# Patient Record
Sex: MALE | Race: WHITE | NOT HISPANIC OR LATINO | Employment: OTHER | URBAN - METROPOLITAN AREA
[De-identification: names, ages, dates, MRNs, and addresses within clinical notes are randomized per-mention and may not be internally consistent; named-entity substitution may affect disease eponyms.]

---

## 2019-04-05 ENCOUNTER — TELEPHONE (OUTPATIENT)
Dept: NEUROLOGY | Facility: CLINIC | Age: 74
End: 2019-04-05

## 2019-04-05 NOTE — TELEPHONE ENCOUNTER
----- Message from Melvina Buchanan sent at 4/5/2019  3:10 PM CDT -----  Contact: Pt   Name of Who is Calling: NIKKI BANERJEE [[id number]]    What is the request in detail: Pt called to request a new pt appt. Pt states he would like a second opinion on his double vision concerns. Please call to further discuss and advise.     Can the clinic reply by [facility]: No     What Number to Call Back if not in [facility]: [phone]

## 2019-04-08 NOTE — TELEPHONE ENCOUNTER
Left pt a vm to call back to make an appt to see Dr. Ruiz. Next availability will be 5/14/19 at 10:00.

## 2019-05-14 ENCOUNTER — OFFICE VISIT (OUTPATIENT)
Dept: NEUROLOGY | Facility: CLINIC | Age: 74
End: 2019-05-14
Attending: PSYCHIATRY & NEUROLOGY
Payer: MEDICARE

## 2019-05-14 ENCOUNTER — LAB VISIT (OUTPATIENT)
Dept: LAB | Facility: OTHER | Age: 74
End: 2019-05-14
Payer: MEDICARE

## 2019-05-14 VITALS
DIASTOLIC BLOOD PRESSURE: 65 MMHG | WEIGHT: 216.69 LBS | BODY MASS INDEX: 29.35 KG/M2 | SYSTOLIC BLOOD PRESSURE: 139 MMHG | HEART RATE: 63 BPM | HEIGHT: 72 IN

## 2019-05-14 DIAGNOSIS — H49.02: ICD-10-CM

## 2019-05-14 DIAGNOSIS — E03.9 ACQUIRED HYPOTHYROIDISM: ICD-10-CM

## 2019-05-14 DIAGNOSIS — I67.89 CEREBRAL MICROVASCULOPATHY: ICD-10-CM

## 2019-05-14 LAB
25(OH)D3+25(OH)D2 SERPL-MCNC: 64 NG/ML (ref 30–96)
ALBUMIN SERPL BCP-MCNC: 3.9 G/DL (ref 3.5–5.2)
ALP SERPL-CCNC: 50 U/L (ref 55–135)
ALT SERPL W/O P-5'-P-CCNC: 19 U/L (ref 10–44)
ANION GAP SERPL CALC-SCNC: 8 MMOL/L (ref 8–16)
AST SERPL-CCNC: 19 U/L (ref 10–40)
BASOPHILS # BLD AUTO: 0.02 K/UL (ref 0–0.2)
BASOPHILS NFR BLD: 0.4 % (ref 0–1.9)
BILIRUB SERPL-MCNC: 0.6 MG/DL (ref 0.1–1)
BUN SERPL-MCNC: 16 MG/DL (ref 8–23)
CALCIUM SERPL-MCNC: 9.3 MG/DL (ref 8.7–10.5)
CHLORIDE SERPL-SCNC: 104 MMOL/L (ref 95–110)
CO2 SERPL-SCNC: 28 MMOL/L (ref 23–29)
CREAT SERPL-MCNC: 1.2 MG/DL (ref 0.5–1.4)
DIFFERENTIAL METHOD: ABNORMAL
EOSINOPHIL # BLD AUTO: 0.2 K/UL (ref 0–0.5)
EOSINOPHIL NFR BLD: 3 % (ref 0–8)
ERYTHROCYTE [DISTWIDTH] IN BLOOD BY AUTOMATED COUNT: 12.6 % (ref 11.5–14.5)
EST. GFR  (AFRICAN AMERICAN): >60 ML/MIN/1.73 M^2
EST. GFR  (NON AFRICAN AMERICAN): >60 ML/MIN/1.73 M^2
ESTIMATED AVG GLUCOSE: 108 MG/DL (ref 68–131)
FOLATE SERPL-MCNC: 10.3 NG/ML (ref 4–24)
GLUCOSE SERPL-MCNC: 88 MG/DL (ref 70–110)
HBA1C MFR BLD HPLC: 5.4 % (ref 4–5.6)
HCT VFR BLD AUTO: 42 % (ref 40–54)
HGB BLD-MCNC: 14.4 G/DL (ref 14–18)
LYMPHOCYTES # BLD AUTO: 1.8 K/UL (ref 1–4.8)
LYMPHOCYTES NFR BLD: 35.6 % (ref 18–48)
MCH RBC QN AUTO: 31.1 PG (ref 27–31)
MCHC RBC AUTO-ENTMCNC: 34.3 G/DL (ref 32–36)
MCV RBC AUTO: 91 FL (ref 82–98)
MONOCYTES # BLD AUTO: 0.4 K/UL (ref 0.3–1)
MONOCYTES NFR BLD: 7.7 % (ref 4–15)
NEUTROPHILS # BLD AUTO: 2.7 K/UL (ref 1.8–7.7)
NEUTROPHILS NFR BLD: 53.1 % (ref 38–73)
PLATELET # BLD AUTO: 119 K/UL (ref 150–350)
PMV BLD AUTO: 11.6 FL (ref 9.2–12.9)
POTASSIUM SERPL-SCNC: 4.4 MMOL/L (ref 3.5–5.1)
PROT SERPL-MCNC: 6.7 G/DL (ref 6–8.4)
RBC # BLD AUTO: 4.63 M/UL (ref 4.6–6.2)
SODIUM SERPL-SCNC: 140 MMOL/L (ref 136–145)
VIT B12 SERPL-MCNC: 231 PG/ML (ref 210–950)
WBC # BLD AUTO: 5.05 K/UL (ref 3.9–12.7)

## 2019-05-14 PROCEDURE — 99214 OFFICE O/P EST MOD 30 MIN: CPT | Mod: PBBFAC | Performed by: PSYCHIATRY & NEUROLOGY

## 2019-05-14 PROCEDURE — 85025 COMPLETE CBC W/AUTO DIFF WBC: CPT

## 2019-05-14 PROCEDURE — 82607 VITAMIN B-12: CPT

## 2019-05-14 PROCEDURE — 36415 COLL VENOUS BLD VENIPUNCTURE: CPT

## 2019-05-14 PROCEDURE — 99205 OFFICE O/P NEW HI 60 MIN: CPT | Mod: S$PBB,,, | Performed by: PSYCHIATRY & NEUROLOGY

## 2019-05-14 PROCEDURE — 80053 COMPREHEN METABOLIC PANEL: CPT

## 2019-05-14 PROCEDURE — 99205 PR OFFICE/OUTPT VISIT, NEW, LEVL V, 60-74 MIN: ICD-10-PCS | Mod: S$PBB,,, | Performed by: PSYCHIATRY & NEUROLOGY

## 2019-05-14 PROCEDURE — 83036 HEMOGLOBIN GLYCOSYLATED A1C: CPT

## 2019-05-14 PROCEDURE — 99999 PR PBB SHADOW E&M-EST. PATIENT-LVL IV: CPT | Mod: PBBFAC,,, | Performed by: PSYCHIATRY & NEUROLOGY

## 2019-05-14 PROCEDURE — 82746 ASSAY OF FOLIC ACID SERUM: CPT

## 2019-05-14 PROCEDURE — 99999 PR PBB SHADOW E&M-EST. PATIENT-LVL IV: ICD-10-PCS | Mod: PBBFAC,,, | Performed by: PSYCHIATRY & NEUROLOGY

## 2019-05-14 PROCEDURE — 82306 VITAMIN D 25 HYDROXY: CPT

## 2019-05-14 NOTE — PROGRESS NOTES
Subjective:       Patient ID: Earl Baldwin is a 74 y.o. male.    Chief Complaint:  Eye Problem (pt had MRI) and Neurologic Problem  acute onset of double vision    History of Present Illness  73 yo RHM w/ cataracts b/l s/p surgical intervention, hypothyroidism, o/w healthy who presents for double vision.  Pt noted acute onset of double vision in December 2018.  This was associated with an acute sensation of imbalance as well.  This happened in the middle of the night.  When he awoke he also noted the double vision.  If he closed his eyes and it resolved.  He did have persistent imbalance and nausea.  He denies any visual field or acuity changes .    He was evaluated by ENT who ruled out an inner ear issue.  He also saw an ophthalmologist who diagnosed with a 3rd nerve palsy.  No noticeable assymetry in pupils but there was divergent eye movements.     Over 2-3 months the double vision resolved.   He did have residual lower R peripheral vision issues.  He is unsure if that was monocular or binocular.     He denied any associated focal weakness, tingling, slurred speech, changes in thought,  He does endorse chronic tinnitus from his time in the .           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       Past Surgical History:   Procedure Laterality Date    ADENOIDECTOMY      EYE SURGERY      hydrocele repair      KNEE ARTHROSCOPY W/ DEBRIDEMENT      left    MRI (Magnetic Resonance Imagine) needs anesthesia N/A 12/17/2018    Performed by [facility]    SPINE SURGERY      TONSILLECTOMY         Family History   Problem Relation Age of Onset    Diabetes Father     Emphysema Father     Diabetes Sister        Social History     Socioeconomic History    Marital status:      Spouse name: Not on file    Number of children: Not on file    Years of education:  Not on file    Highest education level: Not on file   Occupational History    Not on file   Social Needs    Financial resource strain: Not on file    Food insecurity:     Worry: Not on file     Inability: Not on file    Transportation needs:     Medical: Not on file     Non-medical: Not on file   Tobacco Use    Smoking status: Former Smoker     Last attempt to quit: 1979     Years since quittin.3    Smokeless tobacco: Never Used   Substance and Sexual Activity    Alcohol use: Yes     Comment: seldom    Drug use: No    Sexual activity: Yes     Partners: Female   Lifestyle    Physical activity:     Days per week: Not on file     Minutes per session: Not on file    Stress: Not on file   Relationships    Social connections:     Talks on phone: Not on file     Gets together: Not on file     Attends Christian service: Not on file     Active member of club or organization: Not on file     Attends meetings of clubs or organizations: Not on file     Relationship status: Not on file   Other Topics Concern    Not on file   Social History Narrative    Not on file         Current Outpatient Medications:     fish oil-omega-3 fatty acids 300-1,000 mg capsule, Take 1,000 mg by mouth once daily.  , Disp: , Rfl:     glucosam-chondro-herb 149-hyal (GLUCOS CHOND CPLX ADVANCED ORAL), Take 1 tablet by mouth once daily., Disp: , Rfl:     lysine 500 mg Tab, Take 500 mg by mouth once daily.  , Disp: , Rfl:     magnesium gluconate (MAGONATE) 27.5 mg (500 mg) tablet, Take 500 mg by mouth once daily.  , Disp: , Rfl:     thyroid,pork (NATURE-THROID ORAL), Take 194 mg by mouth once daily. , Disp: , Rfl:     vitamin E 1000 UNIT capsule, Take 1,000 Units by mouth once daily., Disp: , Rfl:     Review of Systems  Review of Systems   Constitutional: Negative for chills and fever.   HENT: Positive for tinnitus. Negative for hearing loss.    Eyes: Positive for double vision.   Respiratory: Negative for cough and  hemoptysis.    Cardiovascular: Negative for chest pain and palpitations.   Gastrointestinal: Positive for nausea. Negative for vomiting.   Musculoskeletal: Negative for back pain, falls, joint pain, myalgias and neck pain.   Neurological: Positive for dizziness. Negative for tingling, tremors, sensory change, speech change, focal weakness, seizures, loss of consciousness, weakness and headaches.   Psychiatric/Behavioral: Negative for depression, hallucinations, memory loss, substance abuse and suicidal ideas. The patient is not nervous/anxious and does not have insomnia.        Objective:     Vitals:    05/14/19 1019   BP: 139/65   Pulse: 63      Physical Exam      Constitutional: male appears well-developed and well-nourished.   HENT:   Head: Normocephalic and atraumatic.   Neck and spine: Normal range of motion. Neck supple. No TTP in cervical, thoracic, and lumbar spine  Cardiovascular: Normal rate, regular rhythm and normal heart sounds.  No carotid bruits  Pulmonary/Chest: Effort normal and breath sounds normal.   Abdominal: Soft. Bowel sounds are normal.   Skin: Skin is warm.   Ext: No c/c/e noted    The patient is awake, attentive, Alert, oriented to person, place and time.  Language is intact to comprehension, repetition, and production  Able to follow multi-step commands  No findings to suggest executive dysfuntion    Patient has adequate insight    Mood is stable    No CI  Pursuits were smooth, normal saccades, no nystagmus bilaterally  PERRL, VFFC, EOMI, sensation is intact to LT b/l,    Motor - facial movement was symmetrical and normal, no facial droop seen.   hearing grossly intact  Palate moved well and was symmetrical with normal palatal and oral sensation  Tongue protrudes midline and movements were full  Traps elevated equally b/l      Normal tone.  No spasticity, cogwheel rigidity  Normal bulk. No pronator drift                        Right      Left  Deltoid            5          5  Biceps             5          5  Triceps           5          5  BR                  5          5                   5          5    Hip Flex            5          5  Hip Ext             5          5  Knee Flex         5          5  Knee Ext          5          5  Plantar Flexion  5          5  Dorsiflexion       5          5      No tremor noted    Reflexes trace and symmteric in bl upper and lower extremities, including biceps, triceps, and patellar    Sensory:  Light touch:  intact  Vibration:  intact  Temperature:  intact    Coordination:  F-to-N:  intact    Rapid-alt movements:  Normal amplitude and frequency     Romberg Sign:  negative  General gait:   Decreased arm swing andnormal  turns. Normal postural reflexes.   Tandem gait:  intact        Results for NIKKI BANERJEE (MRN [id number]) as of 5/14/2019 10:33   Ref. Range 1/14/2010 07:50   Sodium Latest Ref Range: 136 - 145 mMol/l 138   Potassium Latest Ref Range: 3.5 - 5.1 mMol/l 3.6   Chloride Latest Ref Range: 95 - 110 mMol/l 103   CO2 Latest Ref Range: 23.0 - 29.0 mEq/L 26   Anion Gap Latest Ref Range: 10 - 20 mmol/L 14   BUN, Bld Latest Ref Range: 8 - 23 mg/dl 14   Creatinine Latest Ref Range: 0.5 - 1.4 mg/dl 1.1   eGFR if non African American Latest Ref Range: >60 mL/min >60   eGFR if  Latest Ref Range: >60 mL/min >60   Glucose Latest Ref Range: 70 - 110 mg/dl 91   Calcium Latest Ref Range: 8.7 - 10.5 mg/dl 9.1   Triglycerides Latest Ref Range: 30 - 150 mg/dL 125   Cholesterol Latest Ref Range: 120 - 199 mg/dL 210 (H)   HDL Latest Ref Range: 40 - 75 mg/dL 42   Hdl/Cholesterol Ratio Latest Ref Range: 20 - 50 % 20.0   LDL Cholesterol Latest Ref Range: 63 - 129 mg/dl 143.0 (H)   Total Cholesterol/HDL Ratio Latest Ref Range: 2.0 - 5.0  5.0   TSH Latest Ref Range: 0.4 - 4.0 uIU/ml 0.065 (L)   T3, Free Latest Ref Range: 2.3 - 4.2 pg/mL 2.9   T4 Total Latest Ref Range: 4.5 - 11.5 ug/dl 5.5   Free T4 Latest Ref Range: 0.71 - 1.51 ng/dl 0.95   PSA, SCREEN  Latest Ref Range: 0 - 4.0 ng/ml 1.0               TSH   Date/Time Value Ref Range Status   01/14/2010 07:50 AM 0.065 (L) 0.4 - 4.0 uIU/ml Final   Neuro-imaging personally reviewed    Results for orders placed or performed during the hospital encounter of 12/17/18   MRI Brain W WO Contrast    Narrative    EXAMINATION:  MRI BRAIN W WO CONTRAST    CLINICAL HISTORY:  other; Third (oculomotor) nerve palsy, left eye    TECHNIQUE:  Multiplanar multisequence MR imaging of the brain was performed before and after the administration of 10 mL Gadavist intravenous contrast.    COMPARISON:  None    FINDINGS:  Symmetric lateral ventricles without hydrocephalus.  There is a small persistent cavum septum pellucidum.    In the subcortical white matter and in the deep white matter of the frontal and the parietal lobes there are a few scattered T2 hyperintensities without restricted diffusion or abnormal enhancement, likely representing chronic microvascular ischemic changes.  No definite for acute infarctions or neoplasms or midline shift or restricted diffusion or abnormal enhancement.  Basal ganglia are symmetric and normal bilaterally.    In the posterior fossa the 4th ventricle is in the midline.  Prominent CSF space dorsal to the vermis, likely representing moderately prominent cisterna magna.  Along the inferior surface of the left cerebellum (image 4 series 6, 7, 16 and 11) there is a faintly T1 hyperintense and a T2 hyperintense lesion with questionable linear enhancement on the postcontrast study.  No vasogenic edema associated with this lesion.  It is possible this could represent a small vascular lesion, like a capillary angioma.  The lesion measures approximately 2 mm in its diameter peer a short-term follow-up MRI in approximately 3-4 months time is suggested.  Brainstem is within normal limits.    There is a dominant tortuous right vertebral artery with normal flow void.  The flow void of the cavernous carotid arteries  and the basilar vessels is otherwise within normal limits.    No definite signs for parasellar masses.    The visualized paranasal air sinuses demonstrate no significant abnormalities except for mild mucosal thickening in the ethmoid sinus from mild sinusitis.    There is a fluid level in the nasopharynx, likely representing secretions in the pharynx.      Impression    1. Subcortical and deep white matter changes, likely representing microvascular ischemic changes.  2. A approximately 2 mm faintly T1 hyperintense and T2 hyperintense focus in the inferior left cerebellum with questionable enhancement.  Etiology for this tiny lesion is not clear from this study but could represent a small lesion like a capillary angioma.  To rule out a neoplastic process a short-term follow-up MRI in approximately 3 months time is suggested.      Electronically signed by: Noe Petty MD  Date:    12/17/2018  Time:    12:12     Assessment:     Problem List Items Addressed This Visit        Neuro    Cerebral microvasculopathy    Relevant Orders    Vitamin B12    Folate    Vitamin D    Hemoglobin A1c    CTA Head    CTA Neck    CBC auto differential    Comprehensive metabolic panel    Echo congenital anomaly 2d only       Ophtho    Total left oculomotor nerve palsy    Current Assessment & Plan     See below         Relevant Orders    Vitamin B12    Folate    Vitamin D    Hemoglobin A1c    CTA Head    CTA Neck    CBC auto differential    Comprehensive metabolic panel    Echo congenital anomaly 2d only       Endocrine    Acquired hypothyroidism    Overview     Results for NIKKI BANERJEE (MRN [id number]) as of 5/14/2019 11:22   Ref. Range 1/14/2010 07:50   TSH Latest Ref Range: 0.4 - 4.0 uIU/ml 0.065 (L)   T3, Free Latest Ref Range: 2.3 - 4.2 pg/mL 2.9   T4 Total Latest Ref Range: 4.5 - 11.5 ug/dl 5.5   Free T4 Latest Ref Range: 0.71 - 1.51 ng/dl 0.95            Current Assessment & Plan     See below                  1. Total left  oculomotor nerve palsy  Vitamin B12    Folate    Vitamin D    Hemoglobin A1c    CTA Head    CTA Neck    CBC auto differential    Comprehensive metabolic panel    Echo congenital anomaly 2d only   2. Cerebral microvasculopathy  Vitamin B12    Folate    Vitamin D    Hemoglobin A1c    CTA Head    CTA Neck    CBC auto differential    Comprehensive metabolic panel    Echo congenital anomaly 2d only   3. Acquired hypothyroidism         73 yo RHM w/ cataracts b/l s/p surgical intervention, hypothyroidism, o/w healthy who presents for double vision.  History is notable for acute onset of double vision and vertigo with subacute but complete resolution.  Exam is notable for WDWN male with no carotid bruits.  Neuro exam is notable for PERRL,   Workup is notable abnormal FLP, WNL CBC/CMP  Pt's presentation is concerning for 3rd nerve palsy now resolved.   Potential but unlikely etiologies including toxic/metabolic (vitamin deficiency), vascular (ophthalmic artery stroke, PCA aneurysm, cerebellar cavernoma), autoimmune/inflammatory (MS)      Plan:      3rd nerve palsy  -labs: B12/folate, HgA1c,  -start Asa 81mg daily  -audiology ([place]): [place]  -CTA H/N, (if deferred will obtain Carotid US)  -ECHO  -f/u Ophthalmology   -brain health    I went over the usual stroke warning signs and symptoms, and the need to activate EMS (call 911) as soon as the symptoms present.  - Sudden onset numbness or weakness of the face, arm, or leg; especially on one side of the body  - Sudden confusion, trouble speaking, or understanding  - Sudden trouble seeing in one or both eyes  - Sudden trouble walking, dizziness, loss of coordination  - Sudden severe headache with no known cause    Hypothyroidism: cont thyroid supplement, monitor TFTs, PCP        Ana Ruiz MD  Neurologist  [facility]     Focused examination was undertaken today.  I spent 60 minutes with the patient  total face to face with the patient.  >50%  of that time was spent on counseling regarding his symptoms, review of diagnostics, and building and coordinating a treatment plan based on the combination of results and symptoms.   Questions were sought and answered to her stated verbal satisfaction.

## 2019-05-14 NOTE — PATIENT INSTRUCTIONS
Thank you for visiting us at [facility].  You were seen by Dr. Ana Ruiz.  Please share any feedback about your experience today on [email or web address] and/or [email or web address].  We appreciate the privilege to participate in your healthcare.  You were seen for double vision.  We will be recommending the following:     3rd nerve palsy  -labs: B12/folate, Vitamin D, HgA1c,  -start Asa 81mg daily, monitor for bleeding/bruising  -audiology ([place]): [place]  -CTA Head/neck (if you choose not to do this please let me know and we can get another less invasive test)  -ECHO (heart ultrasound)  -see Ophthalmology   -brain health    I went over the usual stroke warning signs and symptoms, and the need to activate EMS (call 911) as soon as the symptoms present.  - Sudden onset numbness or weakness of the face, arm, or leg; especially on one side of the body  - Sudden confusion, trouble speaking, or understanding  - Sudden trouble seeing in one or both eyes  - Sudden trouble walking, dizziness, loss of coordination  - Sudden severe headache with no known cause    -Brain Health lifestyle modifications:    -sleep hygiene   - diet: Mediterranean Diet    -exercise: 20-30 minutes of  Moderate exercise 3-5 times a week   -stress management reviewed   -smoking cessation, alcohol moderation    Check out my blog post for further information:  [email or web address]      Sleep Hygiene:    1. Avoid watching TV, eating, and discussing emotional issues in bed. The bed should be used for sleep and sex only. If not, we can associate the bed with other activities and it often becomes difficult to fall asleep.  2. Minimize noise, light, and temperature extremes during sleep with ear plugs, window blinds, or an electric blanket or air conditioner. Even the slightest nighttime noises or luminescent lights can disrupt the quality of your sleep. Try to keep your bedroom at a  comfortable temperature -- not too hot (above 75 degrees) or too cold (below 54 degrees).  3. Try not to drink fluids after 8 p.m. This may reduce awakenings due to urination.  4. Avoid naps, but if you do nap, make it no more than about 25 minutes about eight hours after you awake. But if you have problems falling asleep, then no naps for you.  5. Do not expose your self to bright light if you need to get up at night. Use a small night-light instead.  6. Nicotine is a stimulant and should be avoided particularly near bedtime and upon night awakenings. Having a smoke before bed, although it may feel relaxing, is actually putting a stimulant into your bloodstream.  7. Caffeine is also a stimulant and is present in coffee (100-200 mg), soda (50-75 mg), tea (50-75 mg), and various over-the-counter medications. Caffeine should be discontinued at least four to six hours before bedtime. If you consume large amounts of caffeine and you cut your self off too quickly, beware; you may get headaches that could keep you awake.  8. Although alcohol is a depressant and may help you fall asleep, the subsequent metabolism that clears it from your body when you are sleeping causes a withdrawal syndrome. This withdrawal causes awakenings and is often associated with nightmares and sweats.  9. A light snack may be sleep-inducing, but a heavy meal too close to bedtime interferes with sleep. Stay away from protein and stick to carbohydrates or dairy products. Milk contains the amino acid L-tryptophan, which has been shown in research to help people go to sleep. So milk and cookies or crackers (without chocolate) may be useful and taste good as well.          You may receive a survey about your experience at [facility].  We appreciate you taking the time to complete the survey to help us improve our service and care.

## 2019-05-15 ENCOUNTER — TELEPHONE (OUTPATIENT)
Dept: NEUROLOGY | Facility: CLINIC | Age: 74
End: 2019-05-15

## 2019-05-15 DIAGNOSIS — H93.A9 PULSATILE TINNITUS: ICD-10-CM

## 2019-05-17 ENCOUNTER — HOSPITAL ENCOUNTER (OUTPATIENT)
Dept: RADIOLOGY | Facility: HOSPITAL | Age: 74
Discharge: HOME | End: 2019-05-17
Attending: PSYCHIATRY & NEUROLOGY
Payer: MEDICARE

## 2019-05-17 DIAGNOSIS — H49.02: ICD-10-CM

## 2019-05-17 DIAGNOSIS — I67.89 CEREBRAL MICROVASCULOPATHY: ICD-10-CM

## 2019-05-17 PROCEDURE — 70498 CT ANGIOGRAPHY NECK: CPT | Mod: 26,,, | Performed by: RADIOLOGY

## 2019-05-17 PROCEDURE — 70496 CT ANGIOGRAPHY HEAD: CPT | Mod: 26,,, | Performed by: RADIOLOGY

## 2019-05-17 PROCEDURE — 70498 CTA HEAD AND NECK (XPD): ICD-10-PCS | Mod: 26,,, | Performed by: RADIOLOGY

## 2019-05-17 PROCEDURE — 25500020 PHARM REV CODE 255: Performed by: PSYCHIATRY & NEUROLOGY

## 2019-05-17 PROCEDURE — 70496 CT ANGIOGRAPHY HEAD: CPT | Mod: TC

## 2019-05-17 PROCEDURE — 70496 CTA HEAD AND NECK (XPD): ICD-10-PCS | Mod: 26,,, | Performed by: RADIOLOGY

## 2019-05-17 PROCEDURE — 70498 CT ANGIOGRAPHY NECK: CPT | Mod: TC

## 2019-05-17 RX ADMIN — IOHEXOL 100 ML: 350 INJECTION, SOLUTION INTRAVENOUS at 09:05

## 2019-06-06 ENCOUNTER — TELEPHONE (OUTPATIENT)
Dept: NEUROLOGY | Facility: CLINIC | Age: 74
End: 2019-06-06

## 2019-06-06 DIAGNOSIS — I67.89 CEREBRAL MICROVASCULOPATHY: ICD-10-CM

## 2019-06-06 NOTE — TELEPHONE ENCOUNTER
----- Message from Barb Sheffield MA sent at 6/5/2019  1:31 PM CDT -----  Contact: Self  Please advise, pt have an existing order for an echo test to be done. Its not letting me schedule from order due to some type of restriction. Pt wants to know is this test still needed or did you receive enough information from the CTA/CT?  ----- Message -----  From: Andie Chan  Sent: 6/5/2019   1:02 PM  To: [facility] Staff    Type:  Sooner Apoointment Request    Caller is requesting a sooner appointment.  Caller declined first available appointment listed below.  Caller will not accept being placed on the waitlist and is requesting a message be sent to doctor.    Name of Caller:  patient  Best Call Back Number:  [phone]  Additional Information:  Needs to make appt

## 2019-06-06 NOTE — TELEPHONE ENCOUNTER
CTA H/N negative for aneurysm.  Would recommend ECHO.  Re-ordered. Will have staff schedule and confirm 3 month clinic f/u.

## 2019-06-19 ENCOUNTER — HOSPITAL ENCOUNTER (OUTPATIENT)
Dept: CARDIOLOGY | Facility: HOSPITAL | Age: 74
Discharge: HOME | End: 2019-06-19
Attending: PSYCHIATRY & NEUROLOGY
Payer: MEDICARE

## 2019-06-19 DIAGNOSIS — I67.89 CEREBRAL MICROVASCULOPATHY: ICD-10-CM

## 2019-06-19 PROCEDURE — 93306 TTE W/DOPPLER COMPLETE: CPT

## 2019-06-20 LAB
AORTIC ROOT ANNULUS: 4.16 CM
AORTIC VALVE CUSP SEPERATION: 2.26 CM
AV INDEX (PROSTH): 0.35
AV MEAN GRADIENT: 13.13 MMHG
AV PEAK GRADIENT: 20.07 MMHG
AV VALVE AREA: 1.49 CM2
AV VELOCITY RATIO: 0.36
CV ECHO LV RWT: 0.41 CM
DOP CALC AO PEAK VEL: 2.24 M/S
DOP CALC AO VTI: 51.02 CM
DOP CALC LVOT AREA: 4.23 CM2
DOP CALC LVOT DIAMETER: 2.32 CM
DOP CALC LVOT PEAK VEL: 0.8 M/S
DOP CALC LVOT STROKE VOLUME: 75.97 CM3
DOP CALCLVOT PEAK VEL VTI: 17.98 CM
E WAVE DECELERATION TIME: 270.28 MSEC
E/A RATIO: 0.92
E/E' RATIO: 8.14
ECHO LV POSTERIOR WALL: 1.21 CM (ref 0.6–1.1)
FRACTIONAL SHORTENING: 30 % (ref 28–44)
INTERVENTRICULAR SEPTUM: 1.21 CM (ref 0.6–1.1)
IVRT: 0.06 MSEC
LEFT ATRIUM SIZE: 3.99 CM
LEFT INTERNAL DIMENSION IN SYSTOLE: 4.08 CM (ref 2.1–4)
LEFT VENTRICLE DIASTOLIC VOLUME: 170.32 ML
LEFT VENTRICLE SYSTOLIC VOLUME: 73.29 ML
LEFT VENTRICULAR INTERNAL DIMENSION IN DIASTOLE: 5.86 CM (ref 3.5–6)
LEFT VENTRICULAR MASS: 305.47 G
LV LATERAL E/E' RATIO: 7.13
LV SEPTAL E/E' RATIO: 9.5
MV PEAK A VEL: 0.62 M/S
MV PEAK E VEL: 0.57 M/S
PISA TR MAX VEL: 1.64 M/S
PV PEAK VELOCITY: 1.04 CM/S
RA PRESSURE: 3 MMHG
RIGHT VENTRICULAR END-DIASTOLIC DIMENSION: 3.35 CM
TDI LATERAL: 0.08
TDI SEPTAL: 0.06
TDI: 0.07
TR MAX PG: 10.76 MMHG
TRICUSPID ANNULAR PLANE SYSTOLIC EXCURSION: 1.81 CM
TV REST PULMONARY ARTERY PRESSURE: 14 MMHG

## 2020-02-24 NOTE — PROGRESS NOTES
Earl Baldwin, a 75 y.o. male, presents today for evaluation of his Left and Right knee.      History of Present Illness (HPI)  Location: anterior knee, L>R  Onset: insidious, chronic  Palliative:    Relative rest   Oral analgesics   KRISTINE MCCULLOUGH, 10.2019, per [facility], 100% for 1 hour   fPJUSTYNA, @mary KING date: 10.2019, duration: 4 weeks, minimal Improvement d/c on 12.2019  Provocative:    ADLS   Prolonged ambulation  Prior:  has a past surgical history that includes Spine surgery; Knee arthroscopy w/ mitali LFabio (1990, Dr. Ameya SELLERS)  Progression: worsening discomfort  Quality:    sharp pain  Radiation: none  Severity: per nursing documentation  Timing: intermittent w/ use  Trauma: none     Review of Systems (ROS)  A 10+ review of systems was performed with pertinent positives and negatives noted above in the history of present illness. Other systems were negative unless otherwise specified.    Physical Examination (PE)  General:  The patient is alert and oriented x 3. Mood is pleasant. Observation of ears, eyes and nose reveal no gross abnormalities. HEENT: NCAT, sclera anicteric.   Lungs: Respirations are equal and unlabored.  Gait is coordinated. Patient can toe walk and heel walk without difficulty.    LEFT KNEE EXAMINATION    Observation/Inspection  Gait:   Nonantalgic   Alignment:  Neutral   Scars:   None   Muscle atrophy: Mild  Effusion:  None   Warmth:  None   Discoloration:   none     Tenderness / Crepitus (T / C):         T / C      T / C  Patella   - / -   Lateral joint line   - / -     Peripatellar medial  -  Medial joint line    + / -  Peripatellar lateral -  Medial plica   - / -  Patellar tendon -   Popliteal fossa   - / -  Quad tendon   -   Gastrocnemius   -  Prepatellar Bursa - / -   Quadricep   -  Tibial tubercle  -  Thigh/hamstring  -  Pes anserine/HS -  Fibula    -  ITB   - / -  Tibia     -  Tib/fib joint  - / -  LCL    -    MFC   - / -   MCL: Proximal  -    LFC   - / -    Distal    -          ROM: (* = pain)  PASSIVE   ACTIVE    Left :   5 / 0 / 145   5 / 0 / 145     Right :    5 / 0 / 145   5 / 0 / 145    Patellofemoral examination:  See above noted areas of tenderness.   Patella position    Subluxation / dislocation: Centered        Sup. / Inf;   Normal   Crepitus (PF):    Absent   Patellar Mobility:       Medial-lateral:   Normal    Superior-inferior:  Normal    Inferior tilt   Normal    Patellar tendon:  Normal   Lateral tilt:    Normal   J-sign:     None   Patellofemoral grind:   No pain     Meniscal Signs:     Pain on terminal extension:  +  Pain on terminal flexion:  +  Felicias maneuver:  +*  Squat     NT  Thessaly    NT    Ligament Examination:  ACL / Lachman:  WNL  PCL-Post.  drawer: normal 0 to 2mm  MCL- Valgus:  normal 0 to 2mm  LCL- Varus:    normal 0 to 2mm  Pivot shift:  guarding   Dial Test:   difference c/w other side   At 30° flexion: normal (< 5°)    At 90° flexion: normal (< 5°)   Reverse Pivot Shift:   normal (Equal)     Strength: (* = with pain) Painful Side  Quadriceps   5/5  Hamstrin/5    Extremity Neuro-vascular Examination:   Sensation:  Grossly intact to light touch all dermatomal regions.   Motor Function:  Fully intact motor function at hip, knee, foot and ankle    DTRs;  quadriceps and  achilles 2+.  No clonus and downgoing Babinski.    Vascular status:  DP and PT pulses 2+, brisk capillary refill, symmetric.     Other Findings:    ASSESSMENT & PLAN  Assessment:   #1 Kellgren-Juna Carlos Grade III osteoarthritis of knee, luis enrique med+ant compartments, bilat  Knee pain left >> right    No evidence of neurologic pathology  No evidence of vascular pathology    Imaging studies reviewed:   X-ray knee, bilateral 20.02    Plan:   We discussed the importance of appropriate diet, weight, and regular exercise    We discussed options including    Watchful waiting / relative rest    Physical therapy x   Injection therapy prp+bmc   Consultation    The patient  chooses As above   x = prescribed  CSI = corticosteroid injection  VSI = viscosupplement injection  PRPI = platelet rich plasma injection  ia = intra articular  R = right  L = left  B = bilateral     Physical Therapy        Formal (fPT), @ [facility] facility    Formal (fPT), @ [facility] facility b       Homegoing (hgPT), per concurrent fPT recommendations    Homegoing (hgPT), per prior fPT recommendations    Homegoing (hgPT), handout provided        w/  (atPT)    [blank] = not prescribed  x = prescribed  b = prescribed, and begin as indicated  t = continue as indicated  r = prescribed, and restart as indicated  p = completed prior as indicated  hs = prescribed, and with high school   col = prescribed, and with college or university   nf = physical therapy was recommended, but patient is not interested in PT at this time    Activity (e.g. sports, work) restrictions    [blank] = as tolerated  pt = per physical therapist  at = per     Bracing    [blank] = not prescribed  r = recommended, but not fit with at todays visit  f = prescribed and fit with at todays visit  t = continue as indicated    Pain management    [blank] = No prescription necessary. A handout detailing dosing of appropriate   over-the-counter musculoskeletal analgesics was made available to the patient.   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

## 2020-02-26 ENCOUNTER — HOSPITAL ENCOUNTER (OUTPATIENT)
Dept: RADIOLOGY | Facility: HOSPITAL | Age: 75
Discharge: HOME | End: 2020-02-26
Attending: FAMILY MEDICINE
Payer: MEDICARE

## 2020-02-26 ENCOUNTER — OFFICE VISIT (OUTPATIENT)
Dept: SPORTS MEDICINE | Facility: CLINIC | Age: 75
End: 2020-02-26
Payer: MEDICARE

## 2020-02-26 VITALS — HEIGHT: 72 IN | TEMPERATURE: 97 F | BODY MASS INDEX: 29.26 KG/M2 | WEIGHT: 216 LBS

## 2020-02-26 DIAGNOSIS — G89.29 CHRONIC PAIN OF LEFT KNEE: Primary | ICD-10-CM

## 2020-02-26 DIAGNOSIS — M17.0 PRIMARY OSTEOARTHRITIS OF BOTH KNEES: ICD-10-CM

## 2020-02-26 DIAGNOSIS — M25.562 CHRONIC PAIN OF LEFT KNEE: Primary | ICD-10-CM

## 2020-02-26 DIAGNOSIS — M25.562 LEFT KNEE PAIN, UNSPECIFIED CHRONICITY: ICD-10-CM

## 2020-02-26 PROCEDURE — 0232T NJX PLATELET PLASMA: CPT | Mod: CSM,,, | Performed by: FAMILY MEDICINE

## 2020-02-26 PROCEDURE — 73564 X-RAY EXAM KNEE 4 OR MORE: CPT | Mod: TC,50

## 2020-02-26 PROCEDURE — 99204 OFFICE O/P NEW MOD 45 MIN: CPT | Mod: CSM,S$GLB,, | Performed by: FAMILY MEDICINE

## 2020-02-26 PROCEDURE — 99213 OFFICE O/P EST LOW 20 MIN: CPT | Mod: PBBFAC,25 | Performed by: FAMILY MEDICINE

## 2020-02-26 PROCEDURE — 99999 PR PBB SHADOW E&M-EST. PATIENT-LVL III: ICD-10-PCS | Mod: PBBFAC,,, | Performed by: FAMILY MEDICINE

## 2020-02-26 PROCEDURE — 0232T PR INJECT PLATELET PLASMA W/IMG HARVEST/PREPARATOIN: ICD-10-PCS | Mod: CSM,,, | Performed by: FAMILY MEDICINE

## 2020-02-26 PROCEDURE — 99999 PR PBB SHADOW E&M-EST. PATIENT-LVL III: CPT | Mod: PBBFAC,,, | Performed by: FAMILY MEDICINE

## 2020-02-26 PROCEDURE — 73564 XR KNEE ORTHO BILAT WITH FLEXION: ICD-10-PCS | Mod: 26,50,, | Performed by: RADIOLOGY

## 2020-02-26 PROCEDURE — 99204 PR OFFICE/OUTPT VISIT, NEW, LEVL IV, 45-59 MIN: ICD-10-PCS | Mod: CSM,S$GLB,, | Performed by: FAMILY MEDICINE

## 2020-02-26 PROCEDURE — 73564 X-RAY EXAM KNEE 4 OR MORE: CPT | Mod: 26,50,, | Performed by: RADIOLOGY

## 2020-02-26 NOTE — PROGRESS NOTES
Patient indications  Earl Baldwin, a 75 y.o. male, presents today with:  #1 Kellgren-Juan Carlos Grade III osteoarthritis of knee, luis enrique med+ant compartments, LEFT    Procedure performed  Platelet rich plasma (PRP) injection performed using ultrasound guidance for exact placement of orthobiologic at pathologic site     Patient consent to perform procedure  See the electronic medical record for full written and signed patient consent to proceed with this procedure.    Description of procedure    A) Surgical time out  A surgical time out was performed, including verification of patient ID, procedure to be performed, site and side, availability of information and equipment, review of safety issues, and the patients agreement and consent.     B) Phlebotomy, platelet harvest, and PRP preparation  Sterile technique was used to phlebotomize 120mL of the patients blood from a peripheral vein. This blood was  into density-divided layers using an [employer] centrifuge. The layer of leukocyte poor (hematocrit 2%) PRP, a volume of 3.2mL, was placed into a sterile syringe in preparation for injection.     C) Platelet rich plasma delivery to pathologic site  Injection site: LEFT KNEE  Using 1) physical examination and 2) musculoskeletal ultrasound, the anatomy was visualized and the diseased tissue was identified and confirmed. The procedure site was marked with a skin marker. The patient was placed in position maximizing 1) physician access to pathologic tissue and 2) patient comfort. The skin about the area was sterilized with ChloraPrep (2%w/v CHG, 70% IPA). The site of needle insertion was anesthetized using vapocoolant. Under ultrasound guidance, the needle was advanced to the site of tissue pathology, and the injectate was deposited under direct visualization.    D) Post-procedure care  Following the procedure, a sterile bandage was placed over the injection site.  A procedure after care instructions (ACI)  handout was provided to the patient.    Complications: none     Estimated blood loss from injection procedure: none     Joint aspirate volume, if required: 0 mL     Disposition  The patient tolerated the procedure well and there were no immediate complications. The patient was instructed to call the clinic immediately for any mild to moderate adverse side effects, or to call 911 in the event of an emergency.        Description of ultrasound utilization for needle / probe guidance  Ultrasound guidance was used for needle / probe localization. Images (and videos, if appropriate) were saved and stored for documentation. Dynamic visualization of the needle / probe was continuous throughout the procedure.    0232T

## 2020-02-27 ENCOUNTER — TELEPHONE (OUTPATIENT)
Dept: SPORTS MEDICINE | Facility: CLINIC | Age: 75
End: 2020-02-27

## 2020-02-27 DIAGNOSIS — G89.29 CHRONIC PAIN OF LEFT KNEE: ICD-10-CM

## 2020-02-27 DIAGNOSIS — M25.562 CHRONIC PAIN OF LEFT KNEE: ICD-10-CM

## 2020-02-27 DIAGNOSIS — M17.0 PRIMARY OSTEOARTHRITIS OF BOTH KNEES: Primary | ICD-10-CM

## 2020-02-27 NOTE — TELEPHONE ENCOUNTER
----- Message from Indu Mehta sent at 2/27/2020 11:28 AM CST -----  Contact: PT needs PT orders sent to [facility] in [facility]#   PT needs PT orders sent to [facility] in [facility]#     PT states he needs for physical therapy orders to be sent to [facility] in [place] @# [phone]     Please send over PT orders to [facility] for any problems please contact patient back @# [phone]

## 2020-03-04 ENCOUNTER — OFFICE VISIT (OUTPATIENT)
Dept: SPORTS MEDICINE | Facility: CLINIC | Age: 75
End: 2020-03-04

## 2020-03-04 VITALS — WEIGHT: 216 LBS | TEMPERATURE: 98 F | HEIGHT: 72 IN | BODY MASS INDEX: 29.26 KG/M2

## 2020-03-04 DIAGNOSIS — M25.562 CHRONIC PAIN OF LEFT KNEE: Primary | ICD-10-CM

## 2020-03-04 DIAGNOSIS — M25.462 EFFUSION OF LEFT KNEE: ICD-10-CM

## 2020-03-04 DIAGNOSIS — G89.29 CHRONIC PAIN OF LEFT KNEE: Primary | ICD-10-CM

## 2020-03-04 DIAGNOSIS — M17.12 PRIMARY OSTEOARTHRITIS OF LEFT KNEE: ICD-10-CM

## 2020-03-04 PROCEDURE — 0232T PR INJECT PLATELET PLASMA W/IMG HARVEST/PREPARATOIN: ICD-10-PCS | Mod: CSM,,, | Performed by: FAMILY MEDICINE

## 2020-03-04 PROCEDURE — 99499 UNLISTED E&M SERVICE: CPT | Mod: CSM,S$GLB,, | Performed by: FAMILY MEDICINE

## 2020-03-04 PROCEDURE — 99999 PR PBB SHADOW E&M-EST. PATIENT-LVL III: CPT | Mod: PBBFAC,,, | Performed by: FAMILY MEDICINE

## 2020-03-04 PROCEDURE — 99999 PR PBB SHADOW E&M-EST. PATIENT-LVL III: ICD-10-PCS | Mod: PBBFAC,,, | Performed by: FAMILY MEDICINE

## 2020-03-04 PROCEDURE — 0232T NJX PLATELET PLASMA: CPT | Mod: CSM,,, | Performed by: FAMILY MEDICINE

## 2020-03-04 PROCEDURE — 99499 NO LOS: ICD-10-PCS | Mod: CSM,S$GLB,, | Performed by: FAMILY MEDICINE

## 2020-03-04 RX ORDER — DIAZEPAM 10 MG/1
10 TABLET ORAL DAILY PRN
Qty: 1 TABLET | Refills: 0 | Status: SHIPPED | OUTPATIENT
Start: 2020-03-04 | End: 2021-07-15

## 2020-03-04 NOTE — PROGRESS NOTES
Patient indications  Earl Baldwin, a 75 y.o. male, presents today with:  #1 Kellgren-Juan Carlos Grade III osteoarthritis of knee, luis enrique med+ant compartments, LEFT    Procedure performed  Platelet rich plasma (PRP) injection performed using ultrasound guidance for exact placement of orthobiologic at pathologic site     Patient consent to perform procedure  See the electronic medical record for full written and signed patient consent to proceed with this procedure.    Description of procedure    A) Surgical time out  A surgical time out was performed, including verification of patient ID, procedure to be performed, site and side, availability of information and equipment, review of safety issues, and the patients agreement and consent.     B) Phlebotomy, platelet harvest, and PRP preparation  Sterile technique was used to phlebotomize 120mL of the patients blood from a peripheral vein. This blood was  into density-divided layers using an [employer] centrifuge. The layer of leukocyte poor (hematocrit 2%) PRP, a volume of 3.7mL, was placed into a sterile syringe in preparation for injection.     C) Platelet rich plasma delivery to pathologic site  Injection site: LEFT KNEE  Using 1) physical examination and 2) musculoskeletal ultrasound, the anatomy was visualized and the diseased tissue was identified and confirmed. The procedure site was marked with a skin marker. The patient was placed in position maximizing 1) physician access to pathologic tissue and 2) patient comfort. The skin about the area was sterilized with ChloraPrep (2%w/v CHG, 70% IPA). The site of needle insertion was anesthetized using vapocoolant. Under ultrasound guidance, the needle was advanced to the site of tissue pathology, and the injectate was deposited under direct visualization.    D) Post-procedure care  Following the procedure, a sterile bandage was placed over the injection site.  A procedure after care instructions (ACI)  handout was provided to the patient.    Complications: none     Estimated blood loss from injection procedure: none     Joint aspirate volume, if required: 8 mL     Disposition  The patient tolerated the procedure well and there were no immediate complications. The patient was instructed to call the clinic immediately for any mild to moderate adverse side effects, or to call 911 in the event of an emergency.        Description of ultrasound utilization for needle / probe guidance  Ultrasound guidance was used for needle / probe localization. Images (and videos, if appropriate) were saved and stored for documentation. Dynamic visualization of the needle / probe was continuous throughout the procedure.    0232T

## 2020-03-10 ENCOUNTER — TELEPHONE (OUTPATIENT)
Dept: SPORTS MEDICINE | Facility: CLINIC | Age: 75
End: 2020-03-10

## 2020-03-10 NOTE — TELEPHONE ENCOUNTER
Sent PA fax to via cover my meds on 03.10.2020 for diazePam 10MG tablet rx    Cheo Miller   Orthopaedic Clinical Assistant  [facility]

## 2020-03-20 ENCOUNTER — TELEPHONE (OUTPATIENT)
Dept: SPORTS MEDICINE | Facility: CLINIC | Age: 75
End: 2020-03-20

## 2020-03-20 NOTE — TELEPHONE ENCOUNTER
R/s patient's BMAC procedure    Cheo Miller   Orthopaedic Clinical Assistant  [facility]     ----- Message from Cheo Miller MA sent at 3/19/2020  4:47 PM CDT -----  Contact: self      ----- Message -----  From: Maurizio Mccauley  Sent: 3/19/2020   3:47 PM CDT  To: Aretha VELAZQUEZ Staff    Mg  Pt needs to speak with Cheo Miller  SMA please give pt a call    Contact

## 2020-05-19 ENCOUNTER — TELEPHONE (OUTPATIENT)
Dept: SPORTS MEDICINE | Facility: CLINIC | Age: 75
End: 2020-05-19

## 2020-05-19 DIAGNOSIS — M17.12 PRIMARY OSTEOARTHRITIS OF LEFT KNEE: Primary | ICD-10-CM

## 2020-05-19 DIAGNOSIS — M25.562 CHRONIC PAIN OF LEFT KNEE: ICD-10-CM

## 2020-05-19 DIAGNOSIS — G89.29 CHRONIC PAIN OF LEFT KNEE: ICD-10-CM

## 2020-05-19 NOTE — TELEPHONE ENCOUNTER
New PT orders sent to [facility].     S: pt. reports that he is doing very well.     Cheo Miller   Orthopaedic Clinical Assistant  [facility]          ----- Message from Lexie Merida sent at 5/19/2020  2:00 PM CDT -----  Contact: self  Pt states he needs a new order for his therapy.  Asking for a call back      Contact info [phone]

## 2020-05-28 ENCOUNTER — TELEPHONE (OUTPATIENT)
Dept: SPORTS MEDICINE | Facility: CLINIC | Age: 75
End: 2020-05-28

## 2020-05-28 NOTE — TELEPHONE ENCOUNTER
Subsequent call with patient re: efficacy of injection series. Discussed virtual visit with Dr. Carias.    Patient was pleased. Sent information on the anastacio and how to access appt.    Cheo Miller   Orthopaedic Clinical Assistant  [facility]

## 2020-06-10 ENCOUNTER — TELEPHONE (OUTPATIENT)
Dept: SPORTS MEDICINE | Facility: CLINIC | Age: 75
End: 2020-06-10

## 2020-06-10 NOTE — TELEPHONE ENCOUNTER
Communication w/ pt  His knee is doing well  Will revisit [facility] (postponed subsequent to COVID) at a later date if his pain returns  Continue fPT

## 2021-05-10 ENCOUNTER — PATIENT MESSAGE (OUTPATIENT)
Dept: RESEARCH | Facility: HOSPITAL | Age: 76
End: 2021-05-10

## 2021-05-21 ENCOUNTER — OFFICE VISIT (OUTPATIENT)
Dept: URGENT CARE | Facility: CLINIC | Age: 76
End: 2021-05-21
Payer: MEDICARE

## 2021-05-21 ENCOUNTER — INFUSION (OUTPATIENT)
Dept: INFECTIOUS DISEASES | Facility: HOSPITAL | Age: 76
End: 2021-05-21
Attending: NURSE PRACTITIONER
Payer: MEDICARE

## 2021-05-21 VITALS
DIASTOLIC BLOOD PRESSURE: 60 MMHG | TEMPERATURE: 99 F | BODY MASS INDEX: 29.96 KG/M2 | SYSTOLIC BLOOD PRESSURE: 123 MMHG | RESPIRATION RATE: 18 BRPM | OXYGEN SATURATION: 98 % | WEIGHT: 214 LBS | HEIGHT: 71 IN | HEART RATE: 81 BPM

## 2021-05-21 VITALS
RESPIRATION RATE: 16 BRPM | TEMPERATURE: 98 F | DIASTOLIC BLOOD PRESSURE: 71 MMHG | OXYGEN SATURATION: 98 % | HEART RATE: 95 BPM | SYSTOLIC BLOOD PRESSURE: 118 MMHG

## 2021-05-21 DIAGNOSIS — R53.83 FATIGUE, UNSPECIFIED TYPE: Primary | ICD-10-CM

## 2021-05-21 DIAGNOSIS — U07.1 COVID-19: ICD-10-CM

## 2021-05-21 DIAGNOSIS — U07.1 COVID-19: Primary | ICD-10-CM

## 2021-05-21 LAB
CTP QC/QA: YES
SARS-COV-2 RDRP RESP QL NAA+PROBE: POSITIVE

## 2021-05-21 PROCEDURE — 25000003 PHARM REV CODE 250: Performed by: NURSE PRACTITIONER

## 2021-05-21 PROCEDURE — M0243 CASIRIVI AND IMDEVI INFUSION: HCPCS | Performed by: NURSE PRACTITIONER

## 2021-05-21 PROCEDURE — 99204 OFFICE O/P NEW MOD 45 MIN: CPT | Mod: CR,S$GLB,, | Performed by: NURSE PRACTITIONER

## 2021-05-21 PROCEDURE — 99204 PR OFFICE/OUTPT VISIT, NEW, LEVL IV, 45-59 MIN: ICD-10-PCS | Mod: CR,S$GLB,, | Performed by: NURSE PRACTITIONER

## 2021-05-21 PROCEDURE — 63600175 PHARM REV CODE 636 W HCPCS: Performed by: NURSE PRACTITIONER

## 2021-05-21 PROCEDURE — U0002: ICD-10-PCS | Mod: QW,CR,S$GLB, | Performed by: NURSE PRACTITIONER

## 2021-05-21 PROCEDURE — U0002 COVID-19 LAB TEST NON-CDC: HCPCS | Mod: QW,CR,S$GLB, | Performed by: NURSE PRACTITIONER

## 2021-05-21 RX ORDER — ONDANSETRON 4 MG/1
4 TABLET, ORALLY DISINTEGRATING ORAL ONCE AS NEEDED
Status: DISCONTINUED | OUTPATIENT
Start: 2021-05-21 | End: 2021-07-15

## 2021-05-21 RX ORDER — ACETAMINOPHEN 325 MG/1
650 TABLET ORAL ONCE AS NEEDED
Status: DISCONTINUED | OUTPATIENT
Start: 2021-05-21 | End: 2022-03-02

## 2021-05-21 RX ORDER — ALBUTEROL SULFATE 90 UG/1
2 AEROSOL, METERED RESPIRATORY (INHALATION)
Status: DISCONTINUED | OUTPATIENT
Start: 2021-05-21 | End: 2022-03-02

## 2021-05-21 RX ORDER — EPINEPHRINE 0.3 MG/.3ML
0.3 INJECTION SUBCUTANEOUS
Status: DISCONTINUED | OUTPATIENT
Start: 2021-05-21 | End: 2021-07-15

## 2021-05-21 RX ORDER — DIPHENHYDRAMINE HYDROCHLORIDE 50 MG/ML
25 INJECTION INTRAMUSCULAR; INTRAVENOUS ONCE AS NEEDED
Status: DISCONTINUED | OUTPATIENT
Start: 2021-05-21 | End: 2021-07-15

## 2021-05-21 RX ORDER — SODIUM CHLORIDE 0.9 % (FLUSH) 0.9 %
10 SYRINGE (ML) INJECTION
Status: DISCONTINUED | OUTPATIENT
Start: 2021-05-21 | End: 2021-07-15

## 2021-05-21 RX ADMIN — CASIRIVIMAB 1200 MG: 1332 INJECTION, SOLUTION, CONCENTRATE INTRAVENOUS at 12:05

## 2021-05-21 RX ADMIN — SODIUM CHLORIDE: 0.9 INJECTION, SOLUTION INTRAVENOUS at 12:05

## 2021-05-22 ENCOUNTER — NURSE TRIAGE (OUTPATIENT)
Dept: ADMINISTRATIVE | Facility: CLINIC | Age: 76
End: 2021-05-22

## 2021-05-23 ENCOUNTER — NURSE TRIAGE (OUTPATIENT)
Dept: ADMINISTRATIVE | Facility: CLINIC | Age: 76
End: 2021-05-23

## 2021-06-09 ENCOUNTER — PATIENT MESSAGE (OUTPATIENT)
Dept: PULMONOLOGY | Facility: CLINIC | Age: 76
End: 2021-06-09

## 2021-06-09 ENCOUNTER — HOSPITAL ENCOUNTER (OUTPATIENT)
Dept: RADIOLOGY | Facility: HOSPITAL | Age: 76
Discharge: HOME | End: 2021-06-09
Attending: NURSE PRACTITIONER
Payer: MEDICARE

## 2021-06-09 ENCOUNTER — TELEPHONE (OUTPATIENT)
Dept: PULMONOLOGY | Facility: CLINIC | Age: 76
End: 2021-06-09

## 2021-06-09 ENCOUNTER — OFFICE VISIT (OUTPATIENT)
Dept: PULMONOLOGY | Facility: CLINIC | Age: 76
End: 2021-06-09
Payer: MEDICARE

## 2021-06-09 VITALS
OXYGEN SATURATION: 98 % | HEART RATE: 75 BPM | WEIGHT: 216 LBS | HEIGHT: 71 IN | BODY MASS INDEX: 30.24 KG/M2 | DIASTOLIC BLOOD PRESSURE: 63 MMHG | TEMPERATURE: 97 F | SYSTOLIC BLOOD PRESSURE: 122 MMHG

## 2021-06-09 DIAGNOSIS — Z86.16 HISTORY OF COVID-19: Primary | ICD-10-CM

## 2021-06-09 DIAGNOSIS — Z86.16 HISTORY OF COVID-19: ICD-10-CM

## 2021-06-09 PROCEDURE — 99214 OFFICE O/P EST MOD 30 MIN: CPT | Mod: S$GLB,,, | Performed by: NURSE PRACTITIONER

## 2021-06-09 PROCEDURE — 99214 PR OFFICE/OUTPT VISIT, EST, LEVL IV, 30-39 MIN: ICD-10-PCS | Mod: S$GLB,,, | Performed by: NURSE PRACTITIONER

## 2021-06-09 PROCEDURE — 71046 X-RAY EXAM CHEST 2 VIEWS: CPT | Mod: TC

## 2021-06-09 RX ORDER — THYROID 90 MG/1
60 TABLET ORAL
COMMUNITY
End: 2022-03-02

## 2021-07-15 ENCOUNTER — TELEPHONE (OUTPATIENT)
Dept: PULMONOLOGY | Facility: CLINIC | Age: 76
End: 2021-07-15

## 2021-07-15 ENCOUNTER — HOSPITAL ENCOUNTER (OUTPATIENT)
Dept: RADIOLOGY | Facility: HOSPITAL | Age: 76
Discharge: HOME | End: 2021-07-15
Attending: NURSE PRACTITIONER
Payer: MEDICARE

## 2021-07-15 ENCOUNTER — OFFICE VISIT (OUTPATIENT)
Dept: FAMILY MEDICINE | Facility: CLINIC | Age: 76
End: 2021-07-15
Payer: MEDICARE

## 2021-07-15 VITALS
TEMPERATURE: 98 F | DIASTOLIC BLOOD PRESSURE: 62 MMHG | SYSTOLIC BLOOD PRESSURE: 112 MMHG | OXYGEN SATURATION: 98 % | HEART RATE: 83 BPM | BODY MASS INDEX: 30.56 KG/M2 | WEIGHT: 218.31 LBS | RESPIRATION RATE: 16 BRPM | HEIGHT: 71 IN

## 2021-07-15 DIAGNOSIS — J12.82 PNEUMONIA DUE TO COVID-19 VIRUS: ICD-10-CM

## 2021-07-15 DIAGNOSIS — E03.9 ACQUIRED HYPOTHYROIDISM: ICD-10-CM

## 2021-07-15 DIAGNOSIS — Z00.00 PREVENTATIVE HEALTH CARE: ICD-10-CM

## 2021-07-15 DIAGNOSIS — E03.4 HYPOTHYROIDISM DUE TO ACQUIRED ATROPHY OF THYROID: Primary | ICD-10-CM

## 2021-07-15 DIAGNOSIS — U07.1 PNEUMONIA DUE TO COVID-19 VIRUS: ICD-10-CM

## 2021-07-15 DIAGNOSIS — E66.9 CLASS 1 OBESITY WITHOUT SERIOUS COMORBIDITY WITH BODY MASS INDEX (BMI) OF 30.0 TO 30.9 IN ADULT, UNSPECIFIED OBESITY TYPE: ICD-10-CM

## 2021-07-15 DIAGNOSIS — U07.1 PNEUMONIA DUE TO COVID-19 VIRUS: Primary | ICD-10-CM

## 2021-07-15 DIAGNOSIS — J12.82 PNEUMONIA DUE TO COVID-19 VIRUS: Primary | ICD-10-CM

## 2021-07-15 PROBLEM — E66.811 CLASS 1 OBESITY WITHOUT SERIOUS COMORBIDITY WITH BODY MASS INDEX (BMI) OF 30.0 TO 30.9 IN ADULT: Status: ACTIVE | Noted: 2021-07-15

## 2021-07-15 PROCEDURE — 71046 X-RAY EXAM CHEST 2 VIEWS: CPT | Mod: TC

## 2021-07-15 PROCEDURE — 99215 OFFICE O/P EST HI 40 MIN: CPT | Performed by: FAMILY MEDICINE

## 2021-07-15 PROCEDURE — 99204 PR OFFICE/OUTPT VISIT, NEW, LEVL IV, 45-59 MIN: ICD-10-PCS | Mod: S$PBB,,, | Performed by: FAMILY MEDICINE

## 2021-07-15 PROCEDURE — 99204 OFFICE O/P NEW MOD 45 MIN: CPT | Mod: S$PBB,,, | Performed by: FAMILY MEDICINE

## 2021-07-16 ENCOUNTER — PATIENT MESSAGE (OUTPATIENT)
Dept: PULMONOLOGY | Facility: CLINIC | Age: 76
End: 2021-07-16

## 2021-08-11 ENCOUNTER — LAB VISIT (OUTPATIENT)
Dept: LAB | Facility: HOSPITAL | Age: 76
End: 2021-08-11
Attending: FAMILY MEDICINE
Payer: MEDICARE

## 2021-08-11 DIAGNOSIS — Z00.00 PREVENTATIVE HEALTH CARE: ICD-10-CM

## 2021-08-11 DIAGNOSIS — E03.4 HYPOTHYROIDISM DUE TO ACQUIRED ATROPHY OF THYROID: ICD-10-CM

## 2021-08-11 DIAGNOSIS — E03.9 ACQUIRED HYPOTHYROIDISM: ICD-10-CM

## 2021-08-11 LAB
25(OH)D3+25(OH)D2 SERPL-MCNC: 90 NG/ML (ref 30–96)
ALBUMIN SERPL BCP-MCNC: 3.9 G/DL (ref 3.5–5.2)
ALP SERPL-CCNC: 45 U/L (ref 55–135)
ALT SERPL W/O P-5'-P-CCNC: 22 U/L (ref 10–44)
ANION GAP SERPL CALC-SCNC: 10 MMOL/L (ref 8–16)
AST SERPL-CCNC: 22 U/L (ref 10–40)
BILIRUB SERPL-MCNC: 1.2 MG/DL (ref 0.1–1)
BUN SERPL-MCNC: 19 MG/DL (ref 8–23)
CALCIUM SERPL-MCNC: 9 MG/DL (ref 8.7–10.5)
CHLORIDE SERPL-SCNC: 102 MMOL/L (ref 95–110)
CHOLEST SERPL-MCNC: 209 MG/DL (ref 120–199)
CHOLEST/HDLC SERPL: 3.9 {RATIO} (ref 2–5)
CO2 SERPL-SCNC: 26 MMOL/L (ref 23–29)
CREAT SERPL-MCNC: 1.3 MG/DL (ref 0.5–1.4)
EST. GFR  (AFRICAN AMERICAN): >60 ML/MIN/1.73 M^2
EST. GFR  (NON AFRICAN AMERICAN): 53.8 ML/MIN/1.73 M^2
GLUCOSE SERPL-MCNC: 100 MG/DL (ref 70–110)
HDLC SERPL-MCNC: 54 MG/DL (ref 40–75)
HDLC SERPL: 25.8 % (ref 20–50)
LDLC SERPL CALC-MCNC: 138.2 MG/DL (ref 63–159)
NONHDLC SERPL-MCNC: 155 MG/DL
POTASSIUM SERPL-SCNC: 3.8 MMOL/L (ref 3.5–5.1)
PROT SERPL-MCNC: 6.8 G/DL (ref 6–8.4)
SODIUM SERPL-SCNC: 138 MMOL/L (ref 136–145)
TRIGL SERPL-MCNC: 84 MG/DL (ref 30–150)
TSH SERPL DL<=0.005 MIU/L-ACNC: 0.65 UIU/ML (ref 0.34–5.6)

## 2021-08-11 PROCEDURE — 86803 HEPATITIS C AB TEST: CPT | Performed by: FAMILY MEDICINE

## 2021-08-11 PROCEDURE — 82306 VITAMIN D 25 HYDROXY: CPT | Performed by: FAMILY MEDICINE

## 2021-08-11 PROCEDURE — 80061 LIPID PANEL: CPT | Performed by: FAMILY MEDICINE

## 2021-08-11 PROCEDURE — 84443 ASSAY THYROID STIM HORMONE: CPT | Performed by: FAMILY MEDICINE

## 2021-08-11 PROCEDURE — 80053 COMPREHEN METABOLIC PANEL: CPT | Performed by: FAMILY MEDICINE

## 2021-08-12 LAB — HCV AB S/CO SERPL IA: <0.1 S/CO RATIO (ref 0–0.9)

## 2021-10-18 PROBLEM — Z00.00 PREVENTATIVE HEALTH CARE: Status: RESOLVED | Noted: 2021-07-15 | Resolved: 2021-10-18

## 2021-12-06 ENCOUNTER — OFFICE VISIT (OUTPATIENT)
Dept: PULMONOLOGY | Facility: CLINIC | Age: 76
End: 2021-12-06
Payer: MEDICARE

## 2021-12-06 VITALS
SYSTOLIC BLOOD PRESSURE: 120 MMHG | OXYGEN SATURATION: 98 % | BODY MASS INDEX: 31.64 KG/M2 | WEIGHT: 226 LBS | DIASTOLIC BLOOD PRESSURE: 80 MMHG | HEART RATE: 81 BPM | HEIGHT: 71 IN

## 2021-12-06 DIAGNOSIS — Z86.16 HISTORY OF COVID-19: Primary | ICD-10-CM

## 2021-12-06 PROCEDURE — 99213 PR OFFICE/OUTPT VISIT, EST, LEVL III, 20-29 MIN: ICD-10-PCS | Mod: S$GLB,,, | Performed by: NURSE PRACTITIONER

## 2021-12-06 PROCEDURE — 99213 OFFICE O/P EST LOW 20 MIN: CPT | Mod: S$GLB,,, | Performed by: NURSE PRACTITIONER

## 2021-12-06 RX ORDER — LEVOTHYROXINE SODIUM 150 MCG
25 TABLET ORAL
COMMUNITY
Start: 2021-11-10 | End: 2022-03-02

## 2022-03-02 ENCOUNTER — OFFICE VISIT (OUTPATIENT)
Dept: FAMILY MEDICINE | Facility: CLINIC | Age: 77
End: 2022-03-02
Payer: MEDICARE

## 2022-03-02 VITALS
HEIGHT: 71 IN | BODY MASS INDEX: 31.83 KG/M2 | WEIGHT: 227.38 LBS | SYSTOLIC BLOOD PRESSURE: 126 MMHG | DIASTOLIC BLOOD PRESSURE: 78 MMHG | OXYGEN SATURATION: 98 % | HEART RATE: 87 BPM | TEMPERATURE: 98 F

## 2022-03-02 DIAGNOSIS — I35.0 NONRHEUMATIC AORTIC VALVE STENOSIS: ICD-10-CM

## 2022-03-02 DIAGNOSIS — Z00.00 ENCOUNTER FOR PREVENTIVE HEALTH EXAMINATION: Primary | ICD-10-CM

## 2022-03-02 DIAGNOSIS — K22.70 BARRETT'S ESOPHAGUS WITHOUT DYSPLASIA: ICD-10-CM

## 2022-03-02 DIAGNOSIS — E03.9 ACQUIRED HYPOTHYROIDISM: ICD-10-CM

## 2022-03-02 DIAGNOSIS — M48.8X6 OTHER SPECIFIED SPONDYLOPATHIES, LUMBAR REGION: ICD-10-CM

## 2022-03-02 DIAGNOSIS — E78.5 HYPERLIPIDEMIA, UNSPECIFIED HYPERLIPIDEMIA TYPE: ICD-10-CM

## 2022-03-02 DIAGNOSIS — I35.1 NONRHEUMATIC AORTIC VALVE INSUFFICIENCY: ICD-10-CM

## 2022-03-02 DIAGNOSIS — Z13.6 SCREENING FOR AAA (ABDOMINAL AORTIC ANEURYSM): ICD-10-CM

## 2022-03-02 DIAGNOSIS — M15.9 PRIMARY OSTEOARTHRITIS INVOLVING MULTIPLE JOINTS: ICD-10-CM

## 2022-03-02 PROBLEM — E66.811 CLASS 1 OBESITY WITHOUT SERIOUS COMORBIDITY WITH BODY MASS INDEX (BMI) OF 30.0 TO 30.9 IN ADULT: Status: RESOLVED | Noted: 2021-07-15 | Resolved: 2022-03-02

## 2022-03-02 PROBLEM — H49.02: Status: RESOLVED | Noted: 2019-05-14 | Resolved: 2022-03-02

## 2022-03-02 PROBLEM — I67.89 CEREBRAL MICROVASCULOPATHY: Status: RESOLVED | Noted: 2019-05-14 | Resolved: 2022-03-02

## 2022-03-02 PROBLEM — E66.9 CLASS 1 OBESITY WITHOUT SERIOUS COMORBIDITY WITH BODY MASS INDEX (BMI) OF 30.0 TO 30.9 IN ADULT: Status: RESOLVED | Noted: 2021-07-15 | Resolved: 2022-03-02

## 2022-03-02 PROBLEM — M15.0 PRIMARY OSTEOARTHRITIS INVOLVING MULTIPLE JOINTS: Status: ACTIVE | Noted: 2022-03-02

## 2022-03-02 PROCEDURE — G0439 PPPS, SUBSEQ VISIT: HCPCS | Mod: ,,, | Performed by: NURSE PRACTITIONER

## 2022-03-02 PROCEDURE — 99214 OFFICE O/P EST MOD 30 MIN: CPT | Performed by: NURSE PRACTITIONER

## 2022-03-02 PROCEDURE — G0439 PR MEDICARE ANNUAL WELLNESS SUBSEQUENT VISIT: ICD-10-PCS | Mod: ,,, | Performed by: NURSE PRACTITIONER

## 2022-03-02 RX ORDER — ANTIARTHRITIC COMBINATION NO.2 900 MG
1 TABLET ORAL DAILY
COMMUNITY

## 2022-03-02 RX ORDER — LEVOTHYROXINE SODIUM 25 UG/1
50 TABLET ORAL
COMMUNITY
End: 2023-10-25

## 2022-03-02 RX ORDER — THYROID 60 MG/1
60 TABLET ORAL
COMMUNITY
End: 2022-07-07

## 2022-03-02 NOTE — PROGRESS NOTES
"  Earl Baldwin presented for a  Medicare AWV and comprehensive Health Risk Assessment today. The following components were reviewed and updated:    · Medical history  · Family History  · Social history  · Allergies and Current Medications  · Health Risk Assessment  · Health Maintenance  · Care Team         ** See Completed Assessments for Annual Wellness Visit within the encounter summary.**         The following assessments were completed:  · Living Situation  · CAGE  · Depression Screening  · Timed Get Up and Go  · Whisper Test  · Cognitive Function Screening  · Nutrition Screening  · ADL Screening  · PAQ Screening          Vitals:    03/02/22 1025   BP: 126/78   BP Location: Left arm   Patient Position: Sitting   BP Method: Large (Manual)   Pulse: 87   Temp: 97.9 °F (36.6 °C)   SpO2: 98%   Weight: 103.1 kg (227 lb 6.4 oz)   Height: 5' 11" (1.803 m)     Body mass index is 31.72 kg/m².  Physical Exam  Vitals and nursing note reviewed.   Constitutional:       General: He is awake. He is not in acute distress.     Appearance: Normal appearance. He is obese. He is not ill-appearing, toxic-appearing or diaphoretic.   HENT:      Head: Normocephalic and atraumatic.      Right Ear: Hearing normal.      Left Ear: Hearing normal.      Nose: Nose normal.      Mouth/Throat:      Lips: Pink.      Mouth: Mucous membranes are moist.      Pharynx: Oropharynx is clear. Uvula midline. No oropharyngeal exudate or posterior oropharyngeal erythema.   Eyes:      General: Lids are normal. Gaze aligned appropriately.      Conjunctiva/sclera: Conjunctivae normal.      Right eye: Right conjunctiva is not injected.      Left eye: Left conjunctiva is not injected.      Pupils: Pupils are equal, round, and reactive to light.   Cardiovascular:      Rate and Rhythm: Normal rate and regular rhythm.      Pulses: Normal pulses.      Heart sounds: Normal heart sounds, S1 normal and S2 normal. No murmur heard.    No friction rub. No gallop. "   Pulmonary:      Effort: Pulmonary effort is normal. No respiratory distress.      Breath sounds: Normal breath sounds. No stridor. No decreased breath sounds, wheezing, rhonchi or rales.   Chest:      Chest wall: No tenderness.   Abdominal:      General: Bowel sounds are normal.      Palpations: Abdomen is soft.      Tenderness: There is no abdominal tenderness.   Musculoskeletal:      Cervical back: Neck supple.      Right lower leg: No edema.      Left lower leg: No edema.   Lymphadenopathy:      Cervical: No cervical adenopathy.   Skin:     General: Skin is warm and dry.      Capillary Refill: Capillary refill takes less than 2 seconds.      Findings: No erythema or rash.   Neurological:      Mental Status: He is alert and oriented to person, place, and time. Mental status is at baseline.   Psychiatric:         Attention and Perception: Attention normal.         Mood and Affect: Mood normal.         Speech: Speech normal.         Behavior: Behavior normal. Behavior is cooperative.         Thought Content: Thought content normal.         Judgment: Judgment normal.             Diagnoses and health risks identified today and associated recommendations/orders:    1. Encounter for preventive health examination  Counseled on age and gender appropriate medical preventative services, including cancer screenings, immunizations.  Patient refused immunization.  Last colonoscopy 6-7 years ago, no records available for review.  Patient has 2 PCPs, which makes it difficulty to review records.  Medical management I done by Dr. Massey.  Patient also sees various specialists on different EMRs.    2. Nonrheumatic aortic valve insufficiency  Stable, mild to moderate by echo from July 2021, managed by Cardiology.     3. Nonrheumatic aortic valve stenosis  Stable, moderate by echo July 2021. Asymptomatic. Managed by Cardiology.     4. Hyperlipidemia, unspecified hyperlipidemia type  Patient is taking fish oils, .2, Trig 84,  managed by Dr. Massey.      5. Acquired hypothyroidism  Stable with Levothyroxine 25 mcg and Kyle Thyroid 60 mg daily.  TSH 0.650.  Managed by Dr. Massey.    6. Luis's esophagus without dysplasia  Unsure if patient has Luis's.  Last EGD in Care Everywhere was in 2018, which did not mention Luis's, and patient is to follow-up for repeat EGD as needed.  Managed by Dr. Massey.    7. Body mass index (BMI) 31.0-31.9, adult   Weight loss encouraged.  Monitored by PCP and specialists.     8. Primary osteoarthritis involving multiple joints  Stable, managed by Dr. Massey.    9. Other specified spondylopathies, lumbar region  Stable, sees a chiropractor regularly, managed by Avery Rubi.    10. Screening for AAA (abdominal aortic aneurysm)  Former smoker.  -  AAA Screening; Future      Provided Earl with a 5-10 year written screening schedule and personal prevention plan. Recommendations were developed using the USPSTF age appropriate recommendations. Education, counseling, and referrals were provided as needed. After Visit Summary printed and given to patient which includes a list of additional screenings\tests needed.    This note was created using [employer] voice recognition software that occasionally misinterprets phrases or words.     Follow up in about 1 year (around 3/2/2023) for Your next annual wellness visit.        Lv Rowe NP     I offered to discuss advanced care planning, including how to pick a person who would make decisions for you if you were unable to make them for yourself, called a health care power of , and what kind of decisions you might make such as use of life sustaining treatments such as ventilators and tube feeding when faced with a life limiting illness recorded on a living will that they will need to know. (How you want to be cared for as you near the end of your natural life)     X Patient is interested in learning more about how to make advanced directives.

## 2022-03-02 NOTE — PATIENT INSTRUCTIONS
Counseling and Referral of Other Preventative  (Italic type indicates deductible and co-insurance are waived)    Patient Name: Earl Baldwin  Today's Date: 3/2/2022    Health Maintenance       Date Due Completion Date    TETANUS VACCINE Never done ---    Colorectal Cancer Screening Never done ---    Abdominal Aortic Aneurysm Screening Never done ---    Influenza Vaccine (1) 06/30/2022 (Originally 9/1/2021) 12/6/2005    Shingles Vaccine (1 of 2) 03/02/2023 (Originally 2/20/1997) ---    COVID-19 Vaccine (1) 03/02/2023 (Originally 2/20/1950) ---    Pneumococcal Vaccines (Age 65+) (1 of 1 - PPSV23) 03/02/2023 (Originally 2/20/2012) ---    Lipid Panel 10/05/2026 10/5/2021        Orders Placed This Encounter   Procedures    US AAA Screening     The following information is provided to all patients.  This information is to help you find resources for any of the problems found today that may be affecting your health:                Living healthy guide: www.[facility].[place].gov      Understanding Diabetes: www.diabetes.org      Eating healthy: www.cdc.gov/healthyweight      CDC home safety checklist: www.cdc.gov/steadi/patient.html      Agency on Aging: www.goea.[place].gov      Alcoholics anonymous (AA): www.aa.org      Physical Activity: www.santi.nih.gov/lh8kwlk      Tobacco use: www.quitwithusla.org

## 2022-03-14 ENCOUNTER — HOSPITAL ENCOUNTER (OUTPATIENT)
Dept: RADIOLOGY | Facility: HOSPITAL | Age: 77
Discharge: HOME | End: 2022-03-14
Attending: NURSE PRACTITIONER
Payer: MEDICARE

## 2022-03-14 DIAGNOSIS — Z13.6 SCREENING FOR AAA (ABDOMINAL AORTIC ANEURYSM): ICD-10-CM

## 2022-03-14 PROCEDURE — 76706 US ABDL AORTA SCREEN AAA: CPT | Mod: TC,PO

## 2022-03-16 ENCOUNTER — TELEPHONE (OUTPATIENT)
Dept: FAMILY MEDICINE | Facility: CLINIC | Age: 77
End: 2022-03-16
Payer: MEDICARE

## 2022-03-16 NOTE — TELEPHONE ENCOUNTER
----- Message from Payam Noonan MD sent at 3/14/2022  8:00 AM CDT -----  Results Ok, notify patient.

## 2022-05-05 ENCOUNTER — PATIENT MESSAGE (OUTPATIENT)
Dept: FAMILY MEDICINE | Facility: CLINIC | Age: 77
End: 2022-05-05

## 2022-05-10 DIAGNOSIS — R53.83 FATIGUE: ICD-10-CM

## 2022-05-10 DIAGNOSIS — G47.9 REPETITIVE INTRUSIONS OF SLEEP: Primary | ICD-10-CM

## 2022-05-11 DIAGNOSIS — R53.83 FATIGUE: Primary | ICD-10-CM

## 2022-05-11 DIAGNOSIS — R06.83 SNORING: ICD-10-CM

## 2022-05-11 DIAGNOSIS — G47.33 OBSTRUCTIVE SLEEP APNEA (ADULT) (PEDIATRIC): ICD-10-CM

## 2022-05-30 DIAGNOSIS — G47.33 OSA (OBSTRUCTIVE SLEEP APNEA): Primary | ICD-10-CM

## 2022-05-31 ENCOUNTER — PROCEDURE VISIT (OUTPATIENT)
Dept: SLEEP MEDICINE | Facility: HOSPITAL | Age: 77
End: 2022-05-31
Attending: INTERNAL MEDICINE
Payer: MEDICARE

## 2022-05-31 DIAGNOSIS — G47.33 OSA (OBSTRUCTIVE SLEEP APNEA): ICD-10-CM

## 2022-05-31 PROCEDURE — 95811 POLYSOM 6/>YRS CPAP 4/> PARM: CPT

## 2022-06-22 DIAGNOSIS — G47.33 OSA (OBSTRUCTIVE SLEEP APNEA): Primary | ICD-10-CM

## 2022-07-07 ENCOUNTER — OFFICE VISIT (OUTPATIENT)
Dept: SURGERY | Facility: CLINIC | Age: 77
End: 2022-07-07
Payer: MEDICARE

## 2022-07-07 VITALS
WEIGHT: 228.19 LBS | HEIGHT: 71 IN | SYSTOLIC BLOOD PRESSURE: 132 MMHG | DIASTOLIC BLOOD PRESSURE: 71 MMHG | BODY MASS INDEX: 31.95 KG/M2 | HEART RATE: 69 BPM | RESPIRATION RATE: 16 BRPM

## 2022-07-07 DIAGNOSIS — M79.89 SOFT TISSUE MASS: Primary | ICD-10-CM

## 2022-07-07 PROCEDURE — 99203 OFFICE O/P NEW LOW 30 MIN: CPT | Mod: S$PBB,,, | Performed by: SURGERY

## 2022-07-07 PROCEDURE — 99203 PR OFFICE/OUTPT VISIT, NEW, LEVL III, 30-44 MIN: ICD-10-PCS | Mod: S$PBB,,, | Performed by: SURGERY

## 2022-07-07 PROCEDURE — 99999 PR PBB SHADOW E&M-EST. PATIENT-LVL III: CPT | Mod: PBBFAC,,, | Performed by: SURGERY

## 2022-07-07 PROCEDURE — 99213 OFFICE O/P EST LOW 20 MIN: CPT | Mod: PBBFAC | Performed by: SURGERY

## 2022-07-07 PROCEDURE — 99999 PR PBB SHADOW E&M-EST. PATIENT-LVL III: ICD-10-PCS | Mod: PBBFAC,,, | Performed by: SURGERY

## 2022-07-07 RX ORDER — IPRATROPIUM BROMIDE 42 UG/1
2 SPRAY, METERED NASAL 4 TIMES DAILY
COMMUNITY
Start: 2022-06-24 | End: 2023-01-16 | Stop reason: CLARIF

## 2022-07-07 NOTE — PROGRESS NOTES
Initial Consult    Chief Complaint   Patient presents with    Lump       History of Present Illness:    Patient is a 77 y.o. male who is referred for lump on sternum.  Has had for years, has been growing slightly.      Review of patient's allergies indicates:   Allergen Reactions    Sulfa (sulfonamide antibiotics) Itching and Rash       Current Outpatient Medications   Medication Sig Dispense Refill    calcium carbonate/vitamin D3 (VITAMIN D-3 ORAL) Take 10,000 Units by mouth once daily.      fish oil-omega-3 fatty acids 300-1,000 mg capsule Take 1,000 mg by mouth once daily.      glucosam-chondro-herb 149-hyal (GLUCOS CHOND CPLX ADVANCED ORAL) Take 1 tablet by mouth once daily.      ipratropium (ATROVENT) 42 mcg (0.06 %) nasal spray 2 sprays 4 (four) times daily.      levothyroxine (SYNTHROID) 25 MCG tablet Take 25 mcg by mouth before breakfast.      lysine 500 mg Tab Take 500 mg by mouth once daily.      magnesium gluconate (MAGONATE) 27.5 mg (500 mg) tablet Take 500 mg by mouth once daily.      prasterone, dhea, (DHEA) 25 mg Tab Take 1 tablet by mouth once daily.       No current facility-administered medications for this visit.       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     Past Surgical History:   Procedure Laterality Date    ADENOIDECTOMY      EYE SURGERY      hydrocele repair      KNEE ARTHROSCOPY W/ DEBRIDEMENT      left    MAGNETIC RESONANCE IMAGING N/A 12/17/2018    Procedure: MRI (Magnetic Resonance Imagine) needs anesthesia;  Surgeon: Teddy Hardwick MD;  Location: [facility];  Service: Anesthesiology;  Laterality: N/A;    SPINE SURGERY      TONSILLECTOMY       Family History   Problem Relation Age of Onset    Diabetes Father     Emphysema Father     Diabetes  "Sister      Social History     Tobacco Use    Smoking status: Former Smoker     Quit date: 1979     Years since quittin.5    Smokeless tobacco: Never Used   Substance Use Topics    Alcohol use: Yes     Comment: seldom    Drug use: No          Review of Systems:  Review of Systems   All other systems reviewed and are negative.      Physical:     Vital Signs (Most Recent)  Pulse: 69 (22 1329)  Resp: 16 (22 1329)  BP: 132/71 (22 1329)  5' 11" (1.803 m)  103.5 kg (228 lb 3.2 oz)       Physical Exam  Vitals and nursing note reviewed.   Constitutional:       General: He is awake. He is not in acute distress.     Appearance: Normal appearance. He is obese. He is not ill-appearing, toxic-appearing or diaphoretic.   HENT:      Head: Normocephalic and atraumatic.      Right Ear: Hearing normal.      Left Ear: Hearing normal.      Nose: Nose normal.      Mouth/Throat:      Lips: Pink.      Mouth: Mucous membranes are moist.      Pharynx: Oropharynx is clear. Uvula midline. No oropharyngeal exudate or posterior oropharyngeal erythema.   Eyes:      General: Lids are normal. Gaze aligned appropriately.      Conjunctiva/sclera: Conjunctivae normal.      Right eye: Right conjunctiva is not injected.      Left eye: Left conjunctiva is not injected.      Pupils: Pupils are equal, round, and reactive to light.   Cardiovascular:      Rate and Rhythm: Normal rate and regular rhythm.      Pulses: Normal pulses.      Heart sounds: Normal heart sounds, S1 normal and S2 normal. No murmur heard.    No friction rub. No gallop.   Pulmonary:      Effort: Pulmonary effort is normal. No respiratory distress.      Breath sounds: Normal breath sounds. No stridor. No decreased breath sounds, wheezing, rhonchi or rales.   Chest:      Chest wall: No tenderness.   Abdominal:      General: Bowel sounds are normal.      Palpations: Abdomen is soft.      Tenderness: There is no abdominal tenderness.   Musculoskeletal:      " Cervical back: Neck supple.      Right lower leg: No edema.      Left lower leg: No edema.   Lymphadenopathy:      Cervical: No cervical adenopathy.   Skin:     General: Skin is warm and dry.      Capillary Refill: Capillary refill takes less than 2 seconds.      Findings: No erythema or rash.      Comments: Soft tissue mass in chest well subcutaneous tissue, well circumscribed, mobile   Neurological:      Mental Status: He is alert and oriented to person, place, and time. Mental status is at baseline.   Psychiatric:         Attention and Perception: Attention normal.         Mood and Affect: Mood normal.         Speech: Speech normal.         Behavior: Behavior normal. Behavior is cooperative.         Thought Content: Thought content normal.         Judgment: Judgment normal.         ASSESSMENT/PLAN:        1. Soft tissue mass       Plan minors excision in [place]  We went over risks and benefits, he provided informed consent

## 2022-07-08 ENCOUNTER — PATIENT MESSAGE (OUTPATIENT)
Dept: SURGERY | Facility: CLINIC | Age: 77
End: 2022-07-08
Payer: MEDICARE

## 2022-07-13 ENCOUNTER — PROCEDURE VISIT (OUTPATIENT)
Dept: SLEEP MEDICINE | Facility: HOSPITAL | Age: 77
End: 2022-07-13
Attending: INTERNAL MEDICINE
Payer: MEDICARE

## 2022-07-13 DIAGNOSIS — G47.33 OSA (OBSTRUCTIVE SLEEP APNEA): ICD-10-CM

## 2022-07-13 PROCEDURE — 95811 POLYSOM 6/>YRS CPAP 4/> PARM: CPT

## 2022-07-14 NOTE — PATIENT INSTRUCTIONS
CPAP was performed and pt was informed to call his referring Dr. Monet Massey in 3 to 5 business days to make a f/u appt.

## 2022-07-20 ENCOUNTER — OFFICE VISIT (OUTPATIENT)
Dept: SURGERY | Facility: CLINIC | Age: 77
End: 2022-07-20
Payer: MEDICARE

## 2022-07-20 VITALS
WEIGHT: 222.25 LBS | TEMPERATURE: 97 F | BODY MASS INDEX: 31.11 KG/M2 | DIASTOLIC BLOOD PRESSURE: 63 MMHG | HEIGHT: 71 IN | HEART RATE: 60 BPM | RESPIRATION RATE: 16 BRPM | SYSTOLIC BLOOD PRESSURE: 134 MMHG

## 2022-07-20 DIAGNOSIS — D17.9 LIPOMA, UNSPECIFIED SITE: Primary | ICD-10-CM

## 2022-07-20 PROCEDURE — 88304 PR  SURG PATH,LEVEL III: ICD-10-PCS | Mod: 26,,, | Performed by: STUDENT IN AN ORGANIZED HEALTH CARE EDUCATION/TRAINING PROGRAM

## 2022-07-20 PROCEDURE — 99214 OFFICE O/P EST MOD 30 MIN: CPT | Mod: S$PBB,,, | Performed by: SURGERY

## 2022-07-20 PROCEDURE — 99999 PR PBB SHADOW E&M-EST. PATIENT-LVL IV: ICD-10-PCS | Mod: PBBFAC,,, | Performed by: SURGERY

## 2022-07-20 PROCEDURE — 99214 OFFICE O/P EST MOD 30 MIN: CPT | Mod: PBBFAC,PN | Performed by: SURGERY

## 2022-07-20 PROCEDURE — 88304 TISSUE EXAM BY PATHOLOGIST: CPT | Mod: 26,,, | Performed by: STUDENT IN AN ORGANIZED HEALTH CARE EDUCATION/TRAINING PROGRAM

## 2022-07-20 PROCEDURE — 88304 TISSUE EXAM BY PATHOLOGIST: CPT | Performed by: STUDENT IN AN ORGANIZED HEALTH CARE EDUCATION/TRAINING PROGRAM

## 2022-07-20 PROCEDURE — 99999 PR PBB SHADOW E&M-EST. PATIENT-LVL IV: CPT | Mod: PBBFAC,,, | Performed by: SURGERY

## 2022-07-20 PROCEDURE — 99214 PR OFFICE/OUTPT VISIT, EST, LEVL IV, 30-39 MIN: ICD-10-PCS | Mod: S$PBB,,, | Performed by: SURGERY

## 2022-07-20 RX ORDER — THYROID, PORCINE 30 MG/1
30 TABLET ORAL DAILY
COMMUNITY
Start: 2022-07-13 | End: 2023-10-25

## 2022-07-20 RX ORDER — THYROID, PORCINE 15 MG/1
15 TABLET ORAL DAILY
COMMUNITY
Start: 2022-07-13 | End: 2023-03-06

## 2022-07-20 NOTE — PROGRESS NOTES
Minor Procedure Note:  Subcutaneous mass removal    Date of procedure:   07/20/2022    Indications:  77-year-old male slowly growing subcutaneous mass here for removal    Pre-operative Diagnosis:  Subcutaneous mass    Post-operative Diagnosis: Same    Surgeon: Sugey Fagan MD    Assistants:  None    Anesthesia: General endotracheal anesthesia    ASA Class: 3    Procedure Details     The patient was brought to the minor procedure room and placed in the appropriate positioning.  The chest was clipped and then prepped with Betadine.  Drapes were placed.  A time-out was used to confirm the appropriate procedure with the patient with appropriate identification.  Xylocaine 10 cc then used to anesthetize the area over the subcutaneous tissue.  A number 15 blade was used to create incision over the subcutaneous tissues.  Using blunt dissection and scissors the subcutaneous mass was visualized and elevated.  It was delivered through the skin and it surrounding attachments were taken down.  I felt that the capsule of the subcutaneous mass was completely removed.  It appeared to be a soft fatty tumor.  The wound was then cleaned and hemostasis was achieved.  Vicryl sutures used to approximate subcutaneous tissues.  A running Monocryl suture was used to close the skin.  The mass was approximately 2 cm in diameter.    Findings:  subcutanous mass    Estimated Blood Loss: 5.0 cc    Drains: none    Total IV Fluids: see anesthesia    Specimens: mass    Implants: none    Complications:  None; patient tolerated the procedure well.    Disposition: PACU - hemodynamically stable.    Condition: stable    Attending Attestation: I was present and scrubbed for the entire procedure.

## 2022-07-27 LAB
FINAL PATHOLOGIC DIAGNOSIS: NORMAL
GROSS: NORMAL
Lab: NORMAL

## 2022-08-03 ENCOUNTER — OFFICE VISIT (OUTPATIENT)
Dept: BARIATRICS | Facility: CLINIC | Age: 77
End: 2022-08-03
Payer: MEDICARE

## 2022-08-03 VITALS
WEIGHT: 221.44 LBS | TEMPERATURE: 98 F | RESPIRATION RATE: 16 BRPM | DIASTOLIC BLOOD PRESSURE: 70 MMHG | HEART RATE: 60 BPM | SYSTOLIC BLOOD PRESSURE: 125 MMHG | HEIGHT: 71 IN | BODY MASS INDEX: 31 KG/M2

## 2022-08-03 DIAGNOSIS — M15.9 PRIMARY OSTEOARTHRITIS INVOLVING MULTIPLE JOINTS: ICD-10-CM

## 2022-08-03 DIAGNOSIS — M48.8X6 OTHER SPECIFIED SPONDYLOPATHIES, LUMBAR REGION: Primary | ICD-10-CM

## 2022-08-03 PROCEDURE — 99213 PR OFFICE/OUTPT VISIT, EST, LEVL III, 20-29 MIN: ICD-10-PCS | Mod: S$PBB,,, | Performed by: SURGERY

## 2022-08-03 PROCEDURE — 99999 PR PBB SHADOW E&M-EST. PATIENT-LVL III: ICD-10-PCS | Mod: PBBFAC,,, | Performed by: SURGERY

## 2022-08-03 PROCEDURE — 99213 OFFICE O/P EST LOW 20 MIN: CPT | Mod: PBBFAC,PN | Performed by: SURGERY

## 2022-08-03 PROCEDURE — 99213 OFFICE O/P EST LOW 20 MIN: CPT | Mod: S$PBB,,, | Performed by: SURGERY

## 2022-08-03 PROCEDURE — 99999 PR PBB SHADOW E&M-EST. PATIENT-LVL III: CPT | Mod: PBBFAC,,, | Performed by: SURGERY

## 2022-08-03 NOTE — PROGRESS NOTES
Doing well  No complaints  Vitals:    08/03/22 1414   BP: 125/70   Pulse: 60   Resp: 16   Temp: 98.1 °F (36.7 °C)     Incision healing well     AP    Path reviewed- benign    Doing well  Continue washing over incision with soap and water  No restrictions

## 2023-01-10 ENCOUNTER — TELEPHONE (OUTPATIENT)
Dept: NEUROLOGY | Facility: CLINIC | Age: 78
End: 2023-01-10
Payer: MEDICARE

## 2023-01-10 NOTE — TELEPHONE ENCOUNTER
----- Message from Misha Limon sent at 1/10/2023  2:23 PM CST -----  Regarding: appt; soon  Contact: PT @ [phone] and [phone]  Pt is calling to speak to someone in the to schedule an appt soon. Pt states that he has been experiencing strange sensation; comes in spells; unable to describe. Pt just had an MRI and would like  to look over it; pt has the disc also. No available appts in Epic. Pt was last seen by Dr. Ruiz in May 2019. Pt is asking for a return call back to schedule. Thanks.

## 2023-01-19 NOTE — PROGRESS NOTES
Neurology Clinic Note      Date: 1/23/23  Patient Name: Earl Baldwin   MRN: [id number]   PCP: Payam Noonan  Referring Provider: Self, Aaareferral    Assessment and Plan:   Earl Baldwin is a 77 y.o. 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 Ordered a repeat MRI brain W and W/O contrast to assess for any mets (per prior Radiology report).   He does need to get his MRI done under sedation and he preferred to get the MRI done at the hospital where his daughter works.    Regarding his transient spells, low suspicion for seizures or any primary neurological condition.  These are possibly related to stress.  We will defer any further workup for now.  I encouraged him to get in touch with me if they start to become more frequent or if he develops any new features or symptoms.        Problem List Items Addressed This Visit          Neuro    Transient neurological symptoms       Derm    Capillary angioma - Primary    Overview     L cerebellar T2 hyperintense lesion with postcontrast enhancement.          Relevant Orders    Creatinine, serum (Completed)    MRI Brain W WO Contrast         Subjective:          HPI:   Mr. Earl Baldwin is a 77 y.o. male history of hypothyroidism presenting for evaluation regarding a 'spot on his brain'.    He was previously seen in Neurology Clinic in 2018 for a transient 3rd nerve palsy.  CTA of the head and neck at that time showed no evidence of vascular malformation.  An MRI brain performed of the time showed a T2 hyperintense lesion in the left inferior cerebellum with postcontrast enhancement, possibly a capillary angioma.  However, he was recommended a follow up scan to rule out intracranial metastasis.    He also complains of transient symptoms characterized by periods where he is 'moving in slow motion' and 'staring'.  They typically last 2-3 minutes.  He denies any loss of  awareness and his wife states that he does respond to her and answer questions appropriately.  Denies any dizziness/vertigo or falls.  This occurred last year for the first time over a period of a week when he had 1-3 episodes. He again had another 1-3 episodes last week. 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     Denies any focal weakness/numbness, slurred speech, dysphagia, double vision or vision loss.    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       PAST SURGICAL HISTORY:  Past Surgical History:   Procedure Laterality Date    ADENOIDECTOMY      EYE SURGERY      hydrocele repair      KNEE ARTHROSCOPY W/ DEBRIDEMENT      left    MAGNETIC RESONANCE IMAGING N/A 12/17/2018    Procedure: MRI (Magnetic Resonance Imagine) needs anesthesia;  Surgeon: Teddy Hardwick MD;  [facility];  Service: Anesthesiology;  Laterality: N/A;    MAGNETIC RESONANCE IMAGING N/A 1/19/2023    Procedure: MRI (Magnetic Resonance Imagine);  Surgeon: Teddy Hardwick MD;  [facility];  Service: Anesthesiology;  Laterality: N/A;    SPINE SURGERY      TONSILLECTOMY         CURRENT MEDS:  Current Outpatient Medications   Medication Sig Dispense Refill    ARMOUR THYROID 15 mg Tab Take 15 mg by mouth once daily.      ARMOUR THYROID 30 mg Tab Take 30 mg by mouth once daily.      ascorbic acid, vitamin C, (VITAMIN C) 1000 MG tablet Take 1,000 mg by mouth once daily.      aspirin (ECOTRIN) 81 MG EC tablet Take 81 mg by mouth once daily.       "calcium carbonate/vitamin D3 (VITAMIN D-3 ORAL) Take 10,000 Units by mouth once daily.      fish oil-omega-3 fatty acids 300-1,000 mg capsule Take 1,000 mg by mouth once daily.      glucosam-chondro-herb 149-hyal (GLUCOS CHOND CPLX ADVANCED ORAL) Take 1 tablet by mouth once daily.      levothyroxine (SYNTHROID) 25 MCG tablet Take 25 mcg by mouth before breakfast.      lysine 500 mg Tab Take 500 mg by mouth once daily.      magnesium gluconate (MAGONATE) 27.5 mg (500 mg) tablet Take 500 mg by mouth once daily.      prasterone, dhea, (DHEA) 25 mg Tab Take 1 tablet by mouth once daily.       No current facility-administered medications for this visit.       ALLERGIES:  Review of patient's allergies indicates:   Allergen Reactions    Sulfa (sulfonamide antibiotics) Itching and Rash       FAMILY HISTORY:  Family History   Problem Relation Age of Onset    No Known Problems Mother     Diabetes Father     Emphysema Father     Diabetes Sister        SOCIAL HISTORY:  Social History     Tobacco Use    Smoking status: Former     Types: Cigarettes     Quit date: 1979     Years since quittin.0    Smokeless tobacco: Never   Substance Use Topics    Alcohol use: Yes     Alcohol/week: 10.0 standard drinks     Types: 10 Glasses of wine per week    Drug use: No       Review of Systems:  12 system review of systems is negative except for the symptoms mentioned in HPI.      Objective:     Vitals:    23 1317   Weight: 95 kg (209 lb 7 oz)   Height: 5' 10" (1.778 m)     General: NAD, well nourished   Eyes: no tearing, discharge, no erythema   Neck: Supple, full range of motion  Cardiovascular: Warm and well perfused,  Lungs: Normal work of breathing  Skin: No rash, lesions, or breakdown on exposed skin  Psychiatry: Mood and affect are appropriate       NEUROLOGICAL EXAMINATION:     MENTAL STATUS   Oriented to person, place, and time.   Follows 2 step commands.   Speech: speech is normal   Level of consciousness: " alert    CRANIAL NERVES     CN II   Visual fields full to confrontation.     CN III, IV, VI   Extraocular motions are normal.   CN III: no CN III palsy  CN VI: no CN VI palsy  Nystagmus: none   Ophthalmoparesis: none    CN V   Facial sensation intact.     CN VII   Facial expression full, symmetric.     CN XI   CN XI normal.     CN XII   CN XII normal.     MOTOR EXAM   Overall muscle tone: normal  Right arm pronator drift: absent  Left arm pronator drift: absent    Strength   Right deltoid: 5/5  Left deltoid: 5/5  Right biceps: 5/5  Left biceps: 5/5  Right triceps: 5/5  Left triceps: 5/5  Right interossei: 5/5  Left interossei: 5/5  Right iliopsoas: 5/5  Left iliopsoas: 5/5  Right quadriceps: 5/5  Left quadriceps: 5/5  Right hamstrin/5  Left hamstrin/5  Right glutei: 5/5  Left glutei: 5/5  Right anterior tibial: 5/5  Left anterior tibial: 5/5  Right gastroc: 5/5  Left gastroc: 5/5       No bradykinesia, hypomimia.  No cogwheel rigidity.     REFLEXES     Reflexes   Right brachioradialis: 1+  Left brachioradialis: 1+  Right biceps: 1+  Left biceps: 1+  Right patellar: 1+  Left patellar: 1+  Right achilles: 1+  Left achilles: 1+  Right plantar: normal  Left plantar: normal    SENSORY EXAM   Light touch normal.     GAIT AND COORDINATION     Gait  Gait: normal     Coordination   Finger to nose coordination: normal    Tremor   Resting tremor: absent  Intention tremor: absent      Images:    Other Studies:          Omega Pompa MD  Department of Neurology  [facility]

## 2023-01-20 ENCOUNTER — TELEPHONE (OUTPATIENT)
Dept: NEUROLOGY | Facility: CLINIC | Age: 78
End: 2023-01-20
Payer: MEDICARE

## 2023-01-23 ENCOUNTER — OFFICE VISIT (OUTPATIENT)
Dept: NEUROLOGY | Facility: CLINIC | Age: 78
End: 2023-01-23
Attending: PSYCHIATRY & NEUROLOGY
Payer: MEDICARE

## 2023-01-23 ENCOUNTER — LAB VISIT (OUTPATIENT)
Dept: LAB | Facility: OTHER | Age: 78
End: 2023-01-23
Attending: STUDENT IN AN ORGANIZED HEALTH CARE EDUCATION/TRAINING PROGRAM
Payer: MEDICARE

## 2023-01-23 VITALS — WEIGHT: 209.44 LBS | BODY MASS INDEX: 29.98 KG/M2 | HEIGHT: 70 IN

## 2023-01-23 DIAGNOSIS — I78.1 CAPILLARY ANGIOMA: Primary | ICD-10-CM

## 2023-01-23 DIAGNOSIS — I78.1 CAPILLARY ANGIOMA: ICD-10-CM

## 2023-01-23 DIAGNOSIS — R29.818 TRANSIENT NEUROLOGICAL SYMPTOMS: ICD-10-CM

## 2023-01-23 LAB
CREAT SERPL-MCNC: 1.1 MG/DL (ref 0.5–1.4)
EST. GFR  (NO RACE VARIABLE): >60 ML/MIN/1.73 M^2

## 2023-01-23 PROCEDURE — 99203 OFFICE O/P NEW LOW 30 MIN: CPT | Mod: S$PBB,,, | Performed by: STUDENT IN AN ORGANIZED HEALTH CARE EDUCATION/TRAINING PROGRAM

## 2023-01-23 PROCEDURE — 36415 COLL VENOUS BLD VENIPUNCTURE: CPT | Performed by: STUDENT IN AN ORGANIZED HEALTH CARE EDUCATION/TRAINING PROGRAM

## 2023-01-23 PROCEDURE — 99999 PR PBB SHADOW E&M-EST. PATIENT-LVL III: ICD-10-PCS | Mod: PBBFAC,,, | Performed by: STUDENT IN AN ORGANIZED HEALTH CARE EDUCATION/TRAINING PROGRAM

## 2023-01-23 PROCEDURE — 82565 ASSAY OF CREATININE: CPT | Performed by: STUDENT IN AN ORGANIZED HEALTH CARE EDUCATION/TRAINING PROGRAM

## 2023-01-23 PROCEDURE — 99213 OFFICE O/P EST LOW 20 MIN: CPT | Mod: PBBFAC | Performed by: STUDENT IN AN ORGANIZED HEALTH CARE EDUCATION/TRAINING PROGRAM

## 2023-01-23 PROCEDURE — 99999 PR PBB SHADOW E&M-EST. PATIENT-LVL III: CPT | Mod: PBBFAC,,, | Performed by: STUDENT IN AN ORGANIZED HEALTH CARE EDUCATION/TRAINING PROGRAM

## 2023-01-23 PROCEDURE — 99203 PR OFFICE/OUTPT VISIT, NEW, LEVL III, 30-44 MIN: ICD-10-PCS | Mod: S$PBB,,, | Performed by: STUDENT IN AN ORGANIZED HEALTH CARE EDUCATION/TRAINING PROGRAM

## 2023-01-25 PROBLEM — R29.818 TRANSIENT NEUROLOGICAL SYMPTOMS: Status: ACTIVE | Noted: 2023-01-25

## 2023-03-06 ENCOUNTER — OFFICE VISIT (OUTPATIENT)
Dept: FAMILY MEDICINE | Facility: CLINIC | Age: 78
End: 2023-03-06
Payer: MEDICARE

## 2023-03-06 VITALS
WEIGHT: 219.81 LBS | OXYGEN SATURATION: 99 % | TEMPERATURE: 98 F | SYSTOLIC BLOOD PRESSURE: 124 MMHG | BODY MASS INDEX: 31.47 KG/M2 | HEIGHT: 70 IN | HEART RATE: 90 BPM | DIASTOLIC BLOOD PRESSURE: 76 MMHG

## 2023-03-06 DIAGNOSIS — R29.818 TRANSIENT NEUROLOGICAL SYMPTOMS: ICD-10-CM

## 2023-03-06 DIAGNOSIS — E78.5 HYPERLIPIDEMIA, UNSPECIFIED HYPERLIPIDEMIA TYPE: ICD-10-CM

## 2023-03-06 DIAGNOSIS — Z00.00 ENCOUNTER FOR PREVENTIVE HEALTH EXAMINATION: Primary | ICD-10-CM

## 2023-03-06 DIAGNOSIS — K22.70 BARRETT'S ESOPHAGUS WITHOUT DYSPLASIA: ICD-10-CM

## 2023-03-06 DIAGNOSIS — G47.33 OBSTRUCTIVE SLEEP APNEA: ICD-10-CM

## 2023-03-06 DIAGNOSIS — M48.8X6 OTHER SPECIFIED SPONDYLOPATHIES, LUMBAR REGION: ICD-10-CM

## 2023-03-06 DIAGNOSIS — M15.9 PRIMARY OSTEOARTHRITIS INVOLVING MULTIPLE JOINTS: ICD-10-CM

## 2023-03-06 DIAGNOSIS — I35.1 NONRHEUMATIC AORTIC VALVE INSUFFICIENCY: ICD-10-CM

## 2023-03-06 DIAGNOSIS — I35.0 NONRHEUMATIC AORTIC VALVE STENOSIS: ICD-10-CM

## 2023-03-06 DIAGNOSIS — E03.9 ACQUIRED HYPOTHYROIDISM: ICD-10-CM

## 2023-03-06 DIAGNOSIS — I78.1 CAPILLARY ANGIOMA: ICD-10-CM

## 2023-03-06 PROCEDURE — G0439 PPPS, SUBSEQ VISIT: HCPCS | Mod: ,,, | Performed by: NURSE PRACTITIONER

## 2023-03-06 PROCEDURE — G0439 PR MEDICARE ANNUAL WELLNESS SUBSEQUENT VISIT: ICD-10-PCS | Mod: ,,, | Performed by: NURSE PRACTITIONER

## 2023-03-06 PROCEDURE — 99214 OFFICE O/P EST MOD 30 MIN: CPT | Performed by: NURSE PRACTITIONER

## 2023-03-06 NOTE — PROGRESS NOTES
"  Earl Baldwin presented for a  Medicare AWV and comprehensive Health Risk Assessment today. The following components were reviewed and updated:    Medical history  Family History  Social history  Allergies and Current Medications  Health Risk Assessment  Health Maintenance  Care Team         ** See Completed Assessments for Annual Wellness Visit within the encounter summary.**         The following assessments were completed:  Living Situation  CAGE  Depression Screening  Timed Get Up and Go  Whisper Test  Cognitive Function Screening  Nutrition Screening  ADL Screening  PAQ Screening          Vitals:    03/06/23 1036   BP: 124/76   BP Location: Right arm   Patient Position: Sitting   BP Method: Medium (Manual)   Pulse: 90   Temp: 97.9 °F (36.6 °C)   TempSrc: Oral   SpO2: 99%   Weight: 99.7 kg (219 lb 12.8 oz)   Height: 5' 10" (1.778 m)     Body mass index is 31.54 kg/m².  Physical Exam  Vitals and nursing note reviewed.   Constitutional:       General: He is awake. He is not in acute distress.     Appearance: Normal appearance. He is obese. He is not ill-appearing, toxic-appearing or diaphoretic.   HENT:      Head: Normocephalic and atraumatic.      Right Ear: External ear normal.      Left Ear: External ear normal.      Ears:      Comments: Did not wear his hearing aids today.     Nose: Nose normal.      Mouth/Throat:      Lips: Pink.      Mouth: Mucous membranes are moist.      Pharynx: Oropharynx is clear. Uvula midline. No oropharyngeal exudate or posterior oropharyngeal erythema.   Eyes:      General: Lids are normal. Gaze aligned appropriately.      Conjunctiva/sclera: Conjunctivae normal.      Right eye: Right conjunctiva is not injected.      Left eye: Left conjunctiva is not injected.      Pupils: Pupils are equal, round, and reactive to light.   Cardiovascular:      Rate and Rhythm: Normal rate and regular rhythm.      Pulses: Normal pulses.      Heart sounds: S1 normal and S2 normal. Murmur heard. "   Systolic murmur is present with a grade of 1/6.     No friction rub. No gallop.   Pulmonary:      Effort: Pulmonary effort is normal. No respiratory distress.      Breath sounds: Normal breath sounds. No stridor. No decreased breath sounds, wheezing, rhonchi or rales.   Chest:      Chest wall: No tenderness.   Abdominal:      General: Bowel sounds are normal.      Palpations: Abdomen is soft.      Tenderness: There is no abdominal tenderness.   Musculoskeletal:      Cervical back: Neck supple.      Right lower leg: No edema.      Left lower leg: No edema.   Lymphadenopathy:      Cervical: No cervical adenopathy.   Skin:     General: Skin is warm and dry.      Capillary Refill: Capillary refill takes less than 2 seconds.      Findings: No erythema or rash.   Neurological:      Mental Status: He is alert and oriented to person, place, and time. Mental status is at baseline.   Psychiatric:         Attention and Perception: Attention normal.         Mood and Affect: Mood normal.         Speech: Speech normal.         Behavior: Behavior normal. Behavior is cooperative.         Thought Content: Thought content normal.         Cognition and Memory: Cognition and memory normal.         Judgment: Judgment normal.             Diagnoses and health risks identified today and associated recommendations/orders:    1. Encounter for preventive health examination  Counseled on age and gender appropriate medical preventative services, including cancer screenings, immunizations.  Patient declined all overdue immunizations.  Patient sees a homeopathic MD once yearly who manages most of his care.  Reports having labs at [facility].  Will try to obtain a copy and have it uploaded in media.    2. Transient neurological symptoms  Stable, patient evaluated by Neurology who suspects it is related to stress.  No further work-up per neurology notes.  Patient to contact neurology if symptoms increase in frequency or if he develops new features or  symptoms.    3. Capillary angioma  Needs MRI done under sedation.  MRI is still pending.  Managed by Neurology.    4. Nonrheumatic aortic valve insufficiency  Stable, mild-to-moderate per cardiology notes, asymptomatic, managed by Cardiology.    5. Nonrheumatic aortic valve stenosis  Stable, preserved LV function, mild-to-moderate per cardiology notes, asymptomatic, monitored by Cardiology.    6. Hyperlipidemia, unspecified hyperlipidemia type  Stable, , Trig 73, HDL 54, and Chol 207, managed with diet, managed by Dr. Massey.     7. Obstructive sleep apnea  Stable, managed by ENT.     8. Acquired hypothyroidism  Stable with levothyroxine 50 mcg and Amour Thyroid 30 mg, TSH 1.30, managed by Dr. Massey.      9. Luis's esophagus without dysplasia  Stable, last EGD 7/27/2022, managed by GI.     10. Primary osteoarthritis involving multiple joints  Stable with glucosamine-chondroitin supplement, managed by Dr. Massey.     11. Other specified spondylopathies, lumbar region  Stable, managed by a Chiropractor.     12. Body mass index (BMI) 31.0-31.9, adult   Weight loss encouraged.  Monitored by PCP and specialists.       Provided Earl with a 5-10 year written screening schedule and personal prevention plan. Recommendations were developed using the USPSTF age appropriate recommendations. Education, counseling, and referrals were provided as needed. After Visit Summary printed and given to patient which includes a list of additional screenings\tests needed.    This note was created using [employer] voice recognition software that occasionally misinterprets phrases or words.     Follow up in about 6 months (around 9/6/2023) for Follow-up with PCP in 6 months.        Lv Rowe NP    I offered to discuss advanced care planning, including how to pick a person who would make decisions for you if you were unable to make them for yourself, called a health care power of , and what kind of decisions you  might make such as use of life sustaining treatments such as ventilators and tube feeding when faced with a life limiting illness recorded on a living will that they will need to know. (How you want to be cared for as you near the end of your natural life)     X  Patient has advanced directives written and agrees to provide copies to the institution.

## 2023-03-06 NOTE — PATIENT INSTRUCTIONS
Counseling and Referral of Other Preventative  (Italic type indicates deductible and co-insurance are waived)    Patient Name: Earl Baldwin  Today's Date: 3/6/2023    Health Maintenance       Date Due Completion Date    TETANUS VACCINE 03/06/2024 (Originally 2/20/1963) ---    Shingles Vaccine (1 of 2) 03/06/2024 (Originally 2/20/1997) ---    COVID-19 Vaccine (1) 03/06/2024 (Originally 1945) ---    Pneumococcal Vaccines (Age 65+) (1 - PCV) 03/06/2024 (Originally 2/20/2012) ---    Lipid Panel 11/07/2027 11/7/2022        No orders of the defined types were placed in this encounter.      The following information is provided to all patients.  This information is to help you find resources for any of the problems found today that may be affecting your health:                Living healthy guide: www.[facility].[place].[facility]      Understanding Diabetes: www.diabetes.org      Eating healthy: www.cdc.gov/healthyweight      CDC home safety checklist: www.cdc.gov/steadi/patient.html      Agency on Aging: www.goea.[place].[facility]      Alcoholics anonymous (AA): www.aa.org      Physical Activity: www.santi.nih.gov/zc6urnl      Tobacco use: www.quitwithusla.org

## 2023-03-07 PROBLEM — D69.6 THROMBOCYTOPENIA, UNSPECIFIED: Status: ACTIVE | Noted: 2023-03-07

## 2023-03-14 ENCOUNTER — HOSPITAL ENCOUNTER (OUTPATIENT)
Dept: RADIOLOGY | Facility: HOSPITAL | Age: 78
Discharge: HOME | End: 2023-03-14
Attending: INTERNAL MEDICINE
Payer: MEDICARE

## 2023-03-14 DIAGNOSIS — D69.6 THROMBOCYTOPENIA, UNSPECIFIED: ICD-10-CM

## 2023-03-14 PROCEDURE — 76700 US EXAM ABDOM COMPLETE: CPT | Mod: TC

## 2023-06-09 ENCOUNTER — OFFICE VISIT (OUTPATIENT)
Dept: FAMILY MEDICINE | Facility: CLINIC | Age: 78
End: 2023-06-09
Payer: MEDICARE

## 2023-06-09 VITALS
DIASTOLIC BLOOD PRESSURE: 78 MMHG | BODY MASS INDEX: 31.92 KG/M2 | RESPIRATION RATE: 18 BRPM | OXYGEN SATURATION: 98 % | WEIGHT: 223 LBS | SYSTOLIC BLOOD PRESSURE: 130 MMHG | TEMPERATURE: 99 F | HEIGHT: 70 IN | HEART RATE: 74 BPM

## 2023-06-09 DIAGNOSIS — E78.00 HYPERCHOLESTEREMIA: ICD-10-CM

## 2023-06-09 DIAGNOSIS — R55 POSTURAL DIZZINESS WITH PRESYNCOPE: Primary | ICD-10-CM

## 2023-06-09 DIAGNOSIS — R42 POSTURAL DIZZINESS WITH PRESYNCOPE: Primary | ICD-10-CM

## 2023-06-09 LAB
EKG 12-LEAD: NORMAL
PR INTERVAL: NORMAL
PRT AXES: NORMAL
QRS DURATION: NORMAL
QT/QTC: NORMAL
VENTRICULAR RATE: NORMAL

## 2023-06-09 PROCEDURE — 93005 ELECTROCARDIOGRAM TRACING: CPT | Mod: PBBFAC | Performed by: FAMILY MEDICINE

## 2023-06-09 PROCEDURE — 99214 OFFICE O/P EST MOD 30 MIN: CPT | Mod: 25,S$PBB,AQ, | Performed by: FAMILY MEDICINE

## 2023-06-09 PROCEDURE — 93010 POCT EKG 12-LEAD: ICD-10-PCS | Mod: S$PBB,AQ,, | Performed by: FAMILY MEDICINE

## 2023-06-09 PROCEDURE — 99214 PR OFFICE/OUTPT VISIT, EST, LEVL IV, 30-39 MIN: ICD-10-PCS | Mod: 25,S$PBB,AQ, | Performed by: FAMILY MEDICINE

## 2023-06-09 PROCEDURE — 93010 ELECTROCARDIOGRAM REPORT: CPT | Mod: S$PBB,AQ,, | Performed by: FAMILY MEDICINE

## 2023-06-09 PROCEDURE — 99215 OFFICE O/P EST HI 40 MIN: CPT | Performed by: FAMILY MEDICINE

## 2023-06-09 NOTE — PROGRESS NOTES
Subjective:       Patient ID: Earl Baldwin is a 78 y.o. male.    Chief Complaint: Fatigue and Hyperlipidemia      Patient comes in with symptoms of intermittent faintness lightheaded and dizzy, flushed.  This been going on for several days off and on.  Blood pressure was very high when he experiences  Patient Active Problem List:     Acquired hypothyroidism     Body mass index (BMI) 31.0-31.9, adult      Nonrheumatic aortic valve insufficiency     Nonrheumatic aortic valve stenosis     Other specified spondylopathies, lumbar region     Luis's esophagus     Hyperlipidemia     Primary osteoarthritis involving multiple joints     Capillary angioma     Transient neurological symptoms     Obstructive sleep apnea     Thrombocytopenia, unspecified  Lab Results       Component                Value               Date                       WBC                      5.05                05/14/2019                 HGB                      14.4                05/14/2019                 HCT                      42.0                05/14/2019                 PLT                      119 (L)             05/14/2019                 CHOL                     209 (H)             08/11/2021                 TRIG                     84                  08/11/2021                 HDL                      54                  08/11/2021                 ALT                      22                  08/11/2021                 AST                      22                  08/11/2021                 NA                       138                 08/11/2021                 K                        3.8                 08/11/2021                 CL                       102                 08/11/2021                 CREATININE               1.1                 01/23/2023                 BUN                      19                  08/11/2021                 CO2                      26                  08/11/2021                 TSH                       0.650               2021                 PSA                      1.0                 2010                 HGBA1C                   5.4                 2019                  Hyperlipidemia  This is a chronic problem. The current episode started more than 1 year ago. The problem is uncontrolled. Recent lipid tests were reviewed and are normal. Exacerbating diseases include chronic renal disease and obesity. He has no history of hypothyroidism.     Allergies and Medications:   Review of patient's allergies indicates:   Allergen Reactions    Sulfa (sulfonamide antibiotics) Itching and Rash     Current Outpatient Medications   Medication Sig Dispense Refill    ARMOUR THYROID 30 mg Tab Take 30 mg by mouth once daily.      ascorbic acid, vitamin C, (VITAMIN C) 1000 MG tablet Take 1,000 mg by mouth once daily.      aspirin (ECOTRIN) 81 MG EC tablet Take 81 mg by mouth once daily.      calcium carbonate/vitamin D3 (VITAMIN D-3 ORAL) Take 10,000 Units by mouth once daily.      fish oil-omega-3 fatty acids 300-1,000 mg capsule Take 1,000 mg by mouth once daily.      levothyroxine (SYNTHROID) 25 MCG tablet Take 50 mcg by mouth before breakfast.      lysine 500 mg Tab Take 500 mg by mouth once daily.      magnesium gluconate (MAGONATE) 27.5 mg (500 mg) tablet Take 500 mg by mouth once daily.      prasterone, dhea, (DHEA) 25 mg Tab Take 1 tablet by mouth once daily.       No current facility-administered medications for this visit.       Family History:   Family History   Problem Relation Age of Onset    No Known Problems Mother     Diabetes Father     Emphysema Father     Diabetes Sister        Social History:   Social History     Socioeconomic History    Marital status:    Tobacco Use    Smoking status: Former     Types: Cigarettes     Quit date: 1979     Years since quittin.4    Smokeless tobacco: Never   Substance and Sexual Activity    Alcohol use: Yes     Alcohol/week: 7.0 standard  drinks     Types: 7 Glasses of wine per week     Comment: one glass of wine daily    Drug use: No    Sexual activity: Yes     Partners: Female   Social History Narrative    ** Merged History Encounter **          Social Determinants of Health     Financial Resource Strain: Low Risk     Difficulty of Paying Living Expenses: Not hard at all   Food Insecurity: No Food Insecurity    Worried About Running Out of Food in the Last Year: Never true    Ran Out of Food in the Last Year: Never true   Transportation Needs: No Transportation Needs    Lack of Transportation (Medical): No    Lack of Transportation (Non-Medical): No   Physical Activity: Insufficiently Active    Days of Exercise per Week: 3 days    Minutes of Exercise per Session: 20 min   Stress: No Stress Concern Present    Feeling of Stress : Only a little   Social Connections: Moderately Isolated    Frequency of Communication with Friends and Family: Once a week    Frequency of Social Gatherings with Friends and Family: Never    Attends Congregational Services: More than 4 times per year    Active Member of Clubs or Organizations: No    Attends Club or Organization Meetings: Never    Marital Status:    Housing Stability: Low Risk     Unable to Pay for Housing in the Last Year: No    Number of Places Lived in the Last Year: 1    Unstable Housing in the Last Year: No       Review of Systems    Objective:     Vitals:    06/09/23 0956   BP: 130/78   Pulse: 74   Resp: 18   Temp: 98.7 °F (37.1 °C)        Physical Exam  Vitals and nursing note reviewed.   Constitutional:       General: He is not in acute distress.     Appearance: He is well-developed. He is not ill-appearing, toxic-appearing or diaphoretic.   HENT:      Head: Normocephalic and atraumatic.      Right Ear: External ear normal.      Left Ear: External ear normal.      Nose: Nose normal.      Mouth/Throat:      Pharynx: No oropharyngeal exudate.   Eyes:      General: No scleral icterus.        Right eye:  No discharge.         Left eye: No discharge.      Conjunctiva/sclera: Conjunctivae normal.      Pupils: Pupils are equal, round, and reactive to light.   Neck:      Thyroid: No thyromegaly.      Vascular: No JVD.      Trachea: No tracheal deviation.   Cardiovascular:      Rate and Rhythm: Normal rate.      Heart sounds: Normal heart sounds. No murmur heard.    No friction rub. No gallop.   Pulmonary:      Effort: Pulmonary effort is normal. No respiratory distress.      Breath sounds: Normal breath sounds. No stridor. No wheezing, rhonchi or rales.   Chest:      Chest wall: No tenderness.   Abdominal:      General: Bowel sounds are normal. There is no distension.      Palpations: Abdomen is soft. There is no mass.      Tenderness: There is no abdominal tenderness. There is no guarding or rebound.      Hernia: No hernia is present. There is no hernia in the left inguinal area.   Genitourinary:     Penis: Normal and circumcised. No phimosis, paraphimosis, hypospadias, erythema, tenderness or discharge.       Testes: Normal. Cremasteric reflex is present.         Right: Mass, tenderness or swelling not present. Right testis is descended. Cremasteric reflex is present.          Left: Mass, tenderness or swelling not present. Left testis is descended. Cremasteric reflex is present.       Prostate: Normal.      Rectum: Normal. Guaiac result negative.   Musculoskeletal:         General: No tenderness or deformity.      Cervical back: Normal range of motion and neck supple.      Right lower leg: No edema.      Left lower leg: No edema.   Lymphadenopathy:      Cervical: No cervical adenopathy.      Lower Body: No right inguinal adenopathy. No left inguinal adenopathy.   Skin:     General: Skin is warm and dry.      Capillary Refill: Capillary refill takes less than 2 seconds.      Coloration: Skin is not pale.      Findings: No erythema or rash.   Neurological:      Mental Status: He is alert and oriented to person, place,  and time.      Cranial Nerves: No cranial nerve deficit.      Sensory: No sensory deficit.      Motor: No abnormal muscle tone.      Coordination: Coordination normal.      Deep Tendon Reflexes: Reflexes are normal and symmetric. Reflexes normal.   Psychiatric:         Behavior: Behavior normal.         Thought Content: Thought content normal.         Judgment: Judgment normal.     P.o. CT EKG shows sinus rhythm with a left anterior hemiblock and right bundle branch block  Assessment:       1. Postural dizziness with presyncope    2. Hypercholesteremia        Plan:       Earl was seen today for fatigue and hyperlipidemia.    Diagnoses and all orders for this visit:    Postural dizziness with presyncope  -     Lipid Panel; Future  -     CBC Auto Differential; Future  -     Comprehensive Metabolic Panel; Future  -     Magnesium; Future  -     POCT EKG 12-LEAD (NOT FOR [facility] USE)  -     Ambulatory referral/consult to Cardiology; Future  -     Holter monitor - 24 hour; Future    Hypercholesteremia  -     Lipid Panel; Future         No follow-ups on file.

## 2023-06-20 DIAGNOSIS — R42 POSTURAL DIZZINESS WITH PRESYNCOPE: Primary | ICD-10-CM

## 2023-06-20 DIAGNOSIS — R55 POSTURAL DIZZINESS WITH PRESYNCOPE: Primary | ICD-10-CM

## 2023-06-23 ENCOUNTER — TELEPHONE (OUTPATIENT)
Dept: CARDIOLOGY | Facility: CLINIC | Age: 78
End: 2023-06-23
Payer: MEDICARE

## 2023-06-23 DIAGNOSIS — R42 DIZZINESS: Primary | ICD-10-CM

## 2023-07-07 ENCOUNTER — TELEPHONE (OUTPATIENT)
Dept: FAMILY MEDICINE | Facility: CLINIC | Age: 78
End: 2023-07-07

## 2023-07-07 NOTE — TELEPHONE ENCOUNTER
----- Message from Payam Noonan MD sent at 7/4/2023  1:24 PM CDT -----  The Holter monitor showed a bundle branch block with episodes of bradycardia please follow-up with cardiology as previously referred.

## 2023-07-07 NOTE — TELEPHONE ENCOUNTER
Called patient about holter monitor results. Received voice mail, left messages stating results can be viewed on My Chart anastacio.

## 2023-09-29 ENCOUNTER — TELEPHONE (OUTPATIENT)
Dept: DERMATOLOGY | Facility: CLINIC | Age: 78
End: 2023-09-29
Payer: MEDICARE

## 2023-09-29 NOTE — TELEPHONE ENCOUNTER
----- Message from Pilo Sales sent at 9/29/2023 12:28 PM CDT -----  Contact: Self  Type:  Sooner Appointment Request    Caller is requesting a sooner appointment.  Caller declined first available appointment listed below.  Caller will not accept being placed on the waitlist and is requesting a message be sent to doctor.    Name of Caller:  Spouse/Inna  When is the first available appointment?  03/20  Symptoms:  Spot on back  Best Call Back Number:  [phone], [phone]   Additional Information:  Concerned about spot on back after Sx. Would like to be seen

## 2023-10-02 ENCOUNTER — TELEPHONE (OUTPATIENT)
Dept: BARIATRICS | Facility: CLINIC | Age: 78
End: 2023-10-02
Payer: MEDICARE

## 2023-10-06 ENCOUNTER — OFFICE VISIT (OUTPATIENT)
Dept: DERMATOLOGY | Facility: CLINIC | Age: 78
End: 2023-10-06
Payer: MEDICARE

## 2023-10-06 DIAGNOSIS — D48.5 NEOPLASM OF UNCERTAIN BEHAVIOR OF SKIN: Primary | ICD-10-CM

## 2023-10-06 PROCEDURE — 11102 TANGNTL BX SKIN SINGLE LES: CPT | Mod: PBBFAC,PO | Performed by: DERMATOLOGY

## 2023-10-06 PROCEDURE — 99024 POSTOP FOLLOW-UP VISIT: CPT | Mod: POP,,, | Performed by: DERMATOLOGY

## 2023-10-06 PROCEDURE — 88305 TISSUE EXAM BY PATHOLOGIST: ICD-10-PCS | Mod: 26,,, | Performed by: PATHOLOGY

## 2023-10-06 PROCEDURE — 99999 PR PBB SHADOW E&M-EST. PATIENT-LVL I: ICD-10-PCS | Mod: PBBFAC,,, | Performed by: DERMATOLOGY

## 2023-10-06 PROCEDURE — 99211 OFF/OP EST MAY X REQ PHY/QHP: CPT | Mod: PBBFAC,PO,25 | Performed by: DERMATOLOGY

## 2023-10-06 PROCEDURE — 11103 TANGNTL BX SKIN EA SEP/ADDL: CPT | Mod: PBBFAC,PO | Performed by: DERMATOLOGY

## 2023-10-06 PROCEDURE — 88305 TISSUE EXAM BY PATHOLOGIST: CPT | Mod: 59,PO | Performed by: PATHOLOGY

## 2023-10-06 PROCEDURE — 11103 TANGNTL BX SKIN EA SEP/ADDL: CPT | Mod: S$PBB,,, | Performed by: DERMATOLOGY

## 2023-10-06 PROCEDURE — 11103 PR TANGENTIAL BIOPSY, SKIN, EA ADDTL LESION: ICD-10-PCS | Mod: S$PBB,,, | Performed by: DERMATOLOGY

## 2023-10-06 PROCEDURE — 11102 TANGNTL BX SKIN SINGLE LES: CPT | Mod: S$PBB,,, | Performed by: DERMATOLOGY

## 2023-10-06 PROCEDURE — 11102 PR TANGENTIAL BIOPSY, SKIN, SINGLE LESION: ICD-10-PCS | Mod: S$PBB,,, | Performed by: DERMATOLOGY

## 2023-10-06 PROCEDURE — 99024 PR POST-OP FOLLOW-UP VISIT: ICD-10-PCS | Mod: POP,,, | Performed by: DERMATOLOGY

## 2023-10-06 PROCEDURE — 88305 TISSUE EXAM BY PATHOLOGIST: CPT | Mod: 26,,, | Performed by: PATHOLOGY

## 2023-10-06 PROCEDURE — 99999 PR PBB SHADOW E&M-EST. PATIENT-LVL I: CPT | Mod: PBBFAC,,, | Performed by: DERMATOLOGY

## 2023-10-06 NOTE — PROGRESS NOTES
Dermatology Outpatient Clinic Progress Note    Patient Name: Earl Baldwin  Patient : 1945  Date of Service: 10/6/2023    CC/HPI: Earl Baldwin is a 78 y.o. year old male with history of  signifiant sun exposure  who presents today for evaluation of a lesion on the left upper back and right of midline lower back.  Patient reports left upper back lesion is asymptomatic but another physician was very concerned and recommended he follow up with derm ASAP, lesion on the right of midline lower back is problematic as it gets caught on things and causes him pain.    ROS:   Dermatological ROS: positive for skin lesion changes    Physical Exam   Back           Diagram Legend     Erythematous scaling macule/papule c/w actinic keratosis       Vascular papule c/w angioma      Pigmented verrucoid papule/plaque c/w seborrheic keratosis      Yellow umbilicated papule c/w sebaceous hyperplasia      Irregularly shaped tan macule c/w lentigo     1-2 mm smooth white papules consistent with Milia      Movable subcutaneous cyst with punctum c/w epidermal inclusion cyst      Subcutaneous movable cyst c/w pilar cyst      Firm pink to brown papule c/w dermatofibroma      Pedunculated fleshy papule(s) c/w skin tag(s)      Evenly pigmented macule c/w junctional nevus     Mildly variegated pigmented, slightly irregular-bordered macule c/w mildly atypical nevus      Flesh colored to evenly pigmented papule c/w intradermal nevus       Pink pearly papule/plaque c/w basal cell carcinoma      Erythematous hyperkeratotic cursted plaque c/w SCC      Surgical scar with no sign of skin cancer recurrence      Open and closed comedones      Inflammatory papules and pustules      Verrucoid papule consistent consistent with wart     Erythematous eczematous patches and plaques     Dystrophic onycholytic nail with subungual debris c/w onychomycosis     Umbilicated papule    Erythematous-base heme-crusted tan verrucoid plaque consistent with  inflamed seborrheic keratosis     Erythematous Silvery Scaling Plaque c/w Psoriasis     See annotation    Assessment/Plan:    Diagnoses and all orders for this visit:    Neoplasm of uncertain behavior of skin X2  - left upper back likely SK - r/o SK like melanoma  - right of midline lower back likely acrochordon vs. IDN  -     Specimen to Pathology, Dermatology    Shave Biopsy Procedure Note  Risks, benefits, limitations of shave biopsy discussed. Areas cleansed with alcohol, photographs of suspicious lesions taken in [place]. 2 lesion(s) numbed with 1% lidocaine with 1:200,000 epinephrine. Lesions removed with razor surgery and sent for pathology in formalin. Hemostasis achieved with alumuninum chloride. Polysporin and a band-aid applied.  Will call patient with results once available. Patient tolerated procedure well.         Nayan Ponce MD

## 2023-10-11 ENCOUNTER — OFFICE VISIT (OUTPATIENT)
Dept: SURGERY | Facility: CLINIC | Age: 78
End: 2023-10-11
Payer: MEDICARE

## 2023-10-11 VITALS
BODY MASS INDEX: 31.1 KG/M2 | WEIGHT: 217.25 LBS | RESPIRATION RATE: 16 BRPM | HEART RATE: 70 BPM | TEMPERATURE: 98 F | HEIGHT: 70 IN | SYSTOLIC BLOOD PRESSURE: 115 MMHG | DIASTOLIC BLOOD PRESSURE: 55 MMHG

## 2023-10-11 DIAGNOSIS — K40.90 NON-RECURRENT UNILATERAL INGUINAL HERNIA WITHOUT OBSTRUCTION OR GANGRENE: Primary | ICD-10-CM

## 2023-10-11 LAB
FINAL PATHOLOGIC DIAGNOSIS: NORMAL
GROSS: NORMAL
Lab: NORMAL
MICROSCOPIC EXAM: NORMAL

## 2023-10-11 PROCEDURE — 99999 PR PBB SHADOW E&M-EST. PATIENT-LVL IV: CPT | Mod: PBBFAC,,, | Performed by: SURGERY

## 2023-10-11 PROCEDURE — 99214 OFFICE O/P EST MOD 30 MIN: CPT | Mod: S$PBB,,, | Performed by: SURGERY

## 2023-10-11 PROCEDURE — 99999 PR PBB SHADOW E&M-EST. PATIENT-LVL IV: ICD-10-PCS | Mod: PBBFAC,,, | Performed by: SURGERY

## 2023-10-11 PROCEDURE — 99214 PR OFFICE/OUTPT VISIT, EST, LEVL IV, 30-39 MIN: ICD-10-PCS | Mod: S$PBB,,, | Performed by: SURGERY

## 2023-10-11 PROCEDURE — 99214 OFFICE O/P EST MOD 30 MIN: CPT | Mod: PBBFAC,PN | Performed by: SURGERY

## 2023-10-11 NOTE — PROGRESS NOTES
Initial Consult    Chief Complaint   Patient presents with    Inguinal Hernia       History of Present Illness:  Patient is a 78 y.o. male who is referred for bulge in in his left inguinal region starting 3-4 months ago. Started having pain over this area. Left side bulges out and reduces with pushing.  The area also reduces by putting himself into head down position on an inversion table.    Review of patient's allergies indicates:   Allergen Reactions    Sulfa (sulfonamide antibiotics) Itching and Rash       Current Outpatient Medications   Medication Sig Dispense Refill    ARMOUR THYROID 30 mg Tab Take 30 mg by mouth once daily.      ascorbic acid, vitamin C, (VITAMIN C) 1000 MG tablet Take 1,000 mg by mouth once daily.      aspirin (ECOTRIN) 81 MG EC tablet Take 81 mg by mouth once daily.      calcium carbonate/vitamin D3 (VITAMIN D-3 ORAL) Take 10,000 Units by mouth once daily.      fish oil-omega-3 fatty acids 300-1,000 mg capsule Take 1,000 mg by mouth once daily.      levothyroxine (SYNTHROID) 25 MCG tablet Take 50 mcg by mouth before breakfast.      lysine 500 mg Tab Take 500 mg by mouth once daily.      magnesium gluconate (MAGONATE) 27.5 mg (500 mg) tablet Take 500 mg by mouth once daily.      prasterone, dhea, (DHEA) 25 mg Tab Take 1 tablet by mouth once daily.       No current facility-administered medications for this visit.       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     Past Surgical History:   Procedure Laterality Date    ADENOIDECTOMY      EYE SURGERY      hydrocele repair      KNEE ARTHROSCOPY W/ DEBRIDEMENT      left    MAGNETIC RESONANCE IMAGING N/A 12/17/2018     "Procedure: MRI (Magnetic Resonance Imagine) needs anesthesia;  Surgeon: Teddy Hardwick MD;  Location: [facility];  Service: Anesthesiology;  Laterality: N/A;    MAGNETIC RESONANCE IMAGING N/A 2023    Procedure: MRI (Magnetic Resonance Imagine);  Surgeon: Teddy Hardwick MD;  Location: [facility];  Service: Anesthesiology;  Laterality: N/A;    SPINE SURGERY      TONSILLECTOMY       Family History   Problem Relation Age of Onset    No Known Problems Mother     Diabetes Father     Emphysema Father     Diabetes Sister      Social History     Tobacco Use    Smoking status: Former     Current packs/day: 0.00     Types: Cigarettes     Quit date: 1979     Years since quittin.7    Smokeless tobacco: Never   Substance Use Topics    Alcohol use: Yes     Alcohol/week: 7.0 standard drinks of alcohol     Types: 7 Glasses of wine per week     Comment: one glass of wine daily    Drug use: No            Review of Systems   All other systems reviewed and are negative.      Physical:     Vital Signs (Most Recent)  Temp: 97.5 °F (36.4 °C) (10/11/23 1101)  Pulse: 70 (10/11/23 1101)  Resp: 16 (10/11/23 1101)  BP: (!) 115/55 (10/11/23 1101)  5' 10" (1.778 m)  98.5 kg (217 lb 4.2 oz)     Physical Exam:  Physical Exam  Vitals and nursing note reviewed.   Constitutional:       General: He is not in acute distress.     Appearance: He is not diaphoretic.   HENT:      Head: Atraumatic.   Eyes:      General: No scleral icterus.     Conjunctiva/sclera: Conjunctivae normal.      Pupils: Pupils are equal, round, and reactive to light.   Neck:      Thyroid: No thyromegaly.      Vascular: No JVD.      Trachea: No tracheal deviation.   Cardiovascular:      Rate and Rhythm: Normal rate and regular rhythm.      Heart sounds: Normal heart sounds. No murmur heard.     No friction rub. No gallop.   Pulmonary:      Effort: Pulmonary effort is normal. No respiratory distress.      Breath sounds: Normal breath sounds. No wheezing or " rales.   Chest:      Chest wall: No tenderness.   Abdominal:      General: Bowel sounds are normal. There is no distension.      Palpations: Abdomen is soft. There is no mass.      Tenderness: There is no abdominal tenderness. There is no guarding or rebound.   Genitourinary:     Comments: No easily palpable but impulse felt in left groin  Musculoskeletal:         General: No tenderness. Normal range of motion.      Cervical back: Normal range of motion and neck supple.   Skin:     General: Skin is warm and dry.   Neurological:      Mental Status: He is alert and oriented to person, place, and time.      Cranial Nerves: No cranial nerve deficit.         ASSESSMENT/PLAN:        1. Non-recurrent unilateral inguinal hernia without obstruction or gangrene  CANCELED: US Soft Tissue, Groin Left        Given clinical history is highly suspicious for inguinal hernia.  We will get provocative ultrasound to evaluate.  Plan to follow up afterwards.  Cardiac clearance for possible surgery    Worsening medical problems:  Hernia  Risk factors for elective major surgery:  Obstructive sleep apnea, age

## 2023-10-12 ENCOUNTER — TELEPHONE (OUTPATIENT)
Dept: DERMATOLOGY | Facility: CLINIC | Age: 78
End: 2023-10-12
Payer: MEDICARE

## 2023-10-12 NOTE — TELEPHONE ENCOUNTER
Called patient and informed him both bx were benign.         ----- Message from Nayan Ponce MD sent at 10/12/2023  9:11 AM CDT -----  Both benign pleasure reassure patient   ----- Message -----  From: [employer]  Sent: 10/11/2023  11:55 AM CDT  To: Nayan Ponce MD

## 2023-10-13 ENCOUNTER — HOSPITAL ENCOUNTER (OUTPATIENT)
Dept: RADIOLOGY | Facility: HOSPITAL | Age: 78
Discharge: HOME | End: 2023-10-13
Attending: SURGERY
Payer: MEDICARE

## 2023-10-13 DIAGNOSIS — K40.90 NON-RECURRENT UNILATERAL INGUINAL HERNIA WITHOUT OBSTRUCTION OR GANGRENE: ICD-10-CM

## 2023-10-13 PROCEDURE — 76705 US ABDOMEN LIMITED_HERNIA: ICD-10-PCS | Mod: 26,,, | Performed by: RADIOLOGY

## 2023-10-13 PROCEDURE — 76705 ECHO EXAM OF ABDOMEN: CPT | Mod: TC

## 2023-10-13 PROCEDURE — 76705 ECHO EXAM OF ABDOMEN: CPT | Mod: 26,,, | Performed by: RADIOLOGY

## 2023-10-23 ENCOUNTER — TELEPHONE (OUTPATIENT)
Dept: BARIATRICS | Facility: CLINIC | Age: 78
End: 2023-10-23
Payer: MEDICARE

## 2023-10-25 ENCOUNTER — OFFICE VISIT (OUTPATIENT)
Dept: BARIATRICS | Facility: CLINIC | Age: 78
End: 2023-10-25
Payer: MEDICARE

## 2023-10-25 VITALS
WEIGHT: 218.69 LBS | HEIGHT: 70 IN | RESPIRATION RATE: 16 BRPM | DIASTOLIC BLOOD PRESSURE: 75 MMHG | TEMPERATURE: 99 F | SYSTOLIC BLOOD PRESSURE: 134 MMHG | HEART RATE: 70 BPM | BODY MASS INDEX: 31.31 KG/M2

## 2023-10-25 DIAGNOSIS — K40.90 INGUINAL HERNIA OF LEFT SIDE WITHOUT OBSTRUCTION OR GANGRENE: Primary | ICD-10-CM

## 2023-10-25 PROCEDURE — 99214 OFFICE O/P EST MOD 30 MIN: CPT | Mod: S$PBB,,, | Performed by: SURGERY

## 2023-10-25 PROCEDURE — 99215 OFFICE O/P EST HI 40 MIN: CPT | Mod: PBBFAC,PN | Performed by: SURGERY

## 2023-10-25 PROCEDURE — 99999 PR PBB SHADOW E&M-EST. PATIENT-LVL V: ICD-10-PCS | Mod: PBBFAC,,, | Performed by: SURGERY

## 2023-10-25 PROCEDURE — 99214 PR OFFICE/OUTPT VISIT, EST, LEVL IV, 30-39 MIN: ICD-10-PCS | Mod: S$PBB,,, | Performed by: SURGERY

## 2023-10-25 PROCEDURE — 99999 PR PBB SHADOW E&M-EST. PATIENT-LVL V: CPT | Mod: PBBFAC,,, | Performed by: SURGERY

## 2023-10-25 RX ORDER — SODIUM CHLORIDE 9 MG/ML
INJECTION, SOLUTION INTRAVENOUS CONTINUOUS
Status: CANCELLED | OUTPATIENT
Start: 2023-10-25

## 2023-10-25 RX ORDER — ROSUVASTATIN CALCIUM 10 MG/1
10 TABLET, COATED ORAL DAILY
COMMUNITY
End: 2024-01-05 | Stop reason: SDUPTHER

## 2023-10-25 RX ORDER — THYROID 60 MG/1
60 TABLET ORAL
COMMUNITY
End: 2024-01-05 | Stop reason: SDUPTHER

## 2023-10-25 NOTE — PROGRESS NOTES
Initial Consult    Chief Complaint   Patient presents with    Hernia       History of Present Illness:  Patient is a 78 y.o. male who is referred for inguinal hernia on left.  Notes it is coming in and out more.      Review of patient's allergies indicates:   Allergen Reactions    Sulfa (sulfonamide antibiotics) Itching and Rash       Current Outpatient Medications   Medication Sig Dispense Refill    ascorbic acid, vitamin C, (VITAMIN C) 1000 MG tablet Take 1,000 mg by mouth once daily.      aspirin (ECOTRIN) 81 MG EC tablet Take 81 mg by mouth once daily.      calcium carbonate/vitamin D3 (VITAMIN D-3 ORAL) Take 10,000 Units by mouth once daily.      fish oil-omega-3 fatty acids 300-1,000 mg capsule Take 1,000 mg by mouth once daily.      lysine 500 mg Tab Take 500 mg by mouth once daily.      magnesium gluconate (MAGONATE) 27.5 mg (500 mg) tablet Take 500 mg by mouth once daily.      prasterone, dhea, (DHEA) 25 mg Tab Take 1 tablet by mouth once daily.      rosuvastatin (CRESTOR) 10 MG tablet Take 10 mg by mouth once daily.      thyroid, pork, (ARMOUR THYROID) 60 mg Tab Take 60 mg by mouth before breakfast.       No current facility-administered medications for this visit.       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     Past Surgical History:   Procedure Laterality Date    ADENOIDECTOMY      EYE SURGERY      hydrocele repair      KNEE ARTHROSCOPY W/ DEBRIDEMENT      left    MAGNETIC RESONANCE IMAGING N/A 12/17/2018    Procedure: MRI (Magnetic Resonance Imagine) needs anesthesia;  Surgeon: Teddy Hardwick MD;  Location: [facility];  Service: Anesthesiology;  Laterality: N/A;    MAGNETIC  "RESONANCE IMAGING N/A 2023    Procedure: MRI (Magnetic Resonance Imagine);  Surgeon: Teddy Hardwick MD;  Location: [facility];  Service: Anesthesiology;  Laterality: N/A;    SPINE SURGERY      TONSILLECTOMY       Family History   Problem Relation Age of Onset    No Known Problems Mother     Diabetes Father     Emphysema Father     Diabetes Sister      Social History     Tobacco Use    Smoking status: Former     Current packs/day: 0.00     Types: Cigarettes     Quit date: 1979     Years since quittin.8    Smokeless tobacco: Former   Substance Use Topics    Alcohol use: Yes     Alcohol/week: 7.0 standard drinks of alcohol     Types: 7 Glasses of wine per week     Comment: one glass of wine daily    Drug use: No            Review of Systems    Physical:     Vital Signs (Most Recent)  Temp: 98.7 °F (37.1 °C) (10/25/23 162)  Pulse: 70 (10/25/23 1622)  Resp: 16 (10/25/23 162)  BP: 134/75 (10/25/23 1622)  5' 10" (1.778 m)  99.2 kg (218 lb 11.1 oz)       Physical Exam  Vitals and nursing note reviewed.   Constitutional:       General: He is not in acute distress.     Appearance: He is not diaphoretic.   HENT:      Head: Atraumatic.   Eyes:      General: No scleral icterus.     Conjunctiva/sclera: Conjunctivae normal.      Pupils: Pupils are equal, round, and reactive to light.   Neck:      Thyroid: No thyromegaly.      Vascular: No JVD.      Trachea: No tracheal deviation.   Cardiovascular:      Rate and Rhythm: Normal rate and regular rhythm.      Heart sounds: Normal heart sounds. No murmur heard.     No friction rub. No gallop.   Pulmonary:      Effort: Pulmonary effort is normal. No respiratory distress.      Breath sounds: Normal breath sounds. No wheezing or rales.   Chest:      Chest wall: No tenderness.   Abdominal:      General: Bowel sounds are normal. There is no distension.      Palpations: Abdomen is soft. There is no mass.      Tenderness: There is no abdominal tenderness. There is no " guarding or rebound.   Genitourinary:     Comments: Left inguinal hernia reducible    Musculoskeletal:         General: No tenderness. Normal range of motion.      Cervical back: Normal range of motion and neck supple.   Skin:     General: Skin is warm and dry.   Neurological:      Mental Status: He is alert and oriented to person, place, and time.      Cranial Nerves: No cranial nerve deficit.         ASSESSMENT/PLAN:        1. Inguinal hernia of left side without obstruction or gangrene  Case Request Operating Room: ROBOTIC REPAIR, HERNIA, INGUINAL      2. Inguinal hernia          Plan robotic left inguinal hernia repair  Saw cards already

## 2023-10-25 NOTE — H&P (VIEW-ONLY)
Initial Consult    Chief Complaint   Patient presents with    Hernia       History of Present Illness:  Patient is a 78 y.o. male who is referred for inguinal hernia on left.  Notes it is coming in and out more.      Review of patient's allergies indicates:   Allergen Reactions    Sulfa (sulfonamide antibiotics) Itching and Rash       Current Outpatient Medications   Medication Sig Dispense Refill    ascorbic acid, vitamin C, (VITAMIN C) 1000 MG tablet Take 1,000 mg by mouth once daily.      aspirin (ECOTRIN) 81 MG EC tablet Take 81 mg by mouth once daily.      calcium carbonate/vitamin D3 (VITAMIN D-3 ORAL) Take 10,000 Units by mouth once daily.      fish oil-omega-3 fatty acids 300-1,000 mg capsule Take 1,000 mg by mouth once daily.      lysine 500 mg Tab Take 500 mg by mouth once daily.      magnesium gluconate (MAGONATE) 27.5 mg (500 mg) tablet Take 500 mg by mouth once daily.      prasterone, dhea, (DHEA) 25 mg Tab Take 1 tablet by mouth once daily.      rosuvastatin (CRESTOR) 10 MG tablet Take 10 mg by mouth once daily.      thyroid, pork, (ARMOUR THYROID) 60 mg Tab Take 60 mg by mouth before breakfast.       No current facility-administered medications for this visit.       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     Past Surgical History:   Procedure Laterality Date    ADENOIDECTOMY      EYE SURGERY      hydrocele repair      KNEE ARTHROSCOPY W/ DEBRIDEMENT      left    MAGNETIC RESONANCE IMAGING N/A 12/17/2018    Procedure: MRI (Magnetic Resonance Imagine) needs anesthesia;  Surgeon: Teddy Hardwick MD;  Location: [facility];  Service: Anesthesiology;  Laterality: N/A;    MAGNETIC  "RESONANCE IMAGING N/A 2023    Procedure: MRI (Magnetic Resonance Imagine);  Surgeon: Teddy Hardwick MD;  Location: [facility];  Service: Anesthesiology;  Laterality: N/A;    SPINE SURGERY      TONSILLECTOMY       Family History   Problem Relation Age of Onset    No Known Problems Mother     Diabetes Father     Emphysema Father     Diabetes Sister      Social History     Tobacco Use    Smoking status: Former     Current packs/day: 0.00     Types: Cigarettes     Quit date: 1979     Years since quittin.8    Smokeless tobacco: Former   Substance Use Topics    Alcohol use: Yes     Alcohol/week: 7.0 standard drinks of alcohol     Types: 7 Glasses of wine per week     Comment: one glass of wine daily    Drug use: No            Review of Systems    Physical:     Vital Signs (Most Recent)  Temp: 98.7 °F (37.1 °C) (10/25/23 162)  Pulse: 70 (10/25/23 1622)  Resp: 16 (10/25/23 162)  BP: 134/75 (10/25/23 1622)  5' 10" (1.778 m)  99.2 kg (218 lb 11.1 oz)       Physical Exam  Vitals and nursing note reviewed.   Constitutional:       General: He is not in acute distress.     Appearance: He is not diaphoretic.   HENT:      Head: Atraumatic.   Eyes:      General: No scleral icterus.     Conjunctiva/sclera: Conjunctivae normal.      Pupils: Pupils are equal, round, and reactive to light.   Neck:      Thyroid: No thyromegaly.      Vascular: No JVD.      Trachea: No tracheal deviation.   Cardiovascular:      Rate and Rhythm: Normal rate and regular rhythm.      Heart sounds: Normal heart sounds. No murmur heard.     No friction rub. No gallop.   Pulmonary:      Effort: Pulmonary effort is normal. No respiratory distress.      Breath sounds: Normal breath sounds. No wheezing or rales.   Chest:      Chest wall: No tenderness.   Abdominal:      General: Bowel sounds are normal. There is no distension.      Palpations: Abdomen is soft. There is no mass.      Tenderness: There is no abdominal tenderness. There is no " guarding or rebound.   Genitourinary:     Comments: Left inguinal hernia reducible    Musculoskeletal:         General: No tenderness. Normal range of motion.      Cervical back: Normal range of motion and neck supple.   Skin:     General: Skin is warm and dry.   Neurological:      Mental Status: He is alert and oriented to person, place, and time.      Cranial Nerves: No cranial nerve deficit.         ASSESSMENT/PLAN:        1. Inguinal hernia of left side without obstruction or gangrene  Case Request Operating Room: ROBOTIC REPAIR, HERNIA, INGUINAL      2. Inguinal hernia          Plan robotic left inguinal hernia repair  Saw cards already

## 2023-10-30 ENCOUNTER — PATIENT MESSAGE (OUTPATIENT)
Dept: SURGERY | Facility: HOSPITAL | Age: 78
End: 2023-10-30

## 2023-11-01 ENCOUNTER — HOSPITAL ENCOUNTER (OUTPATIENT)
Dept: PREADMISSION TESTING | Facility: HOSPITAL | Age: 78
Discharge: HOME | End: 2023-11-01
Attending: SURGERY
Payer: MEDICARE

## 2023-11-01 VITALS — WEIGHT: 210 LBS | BODY MASS INDEX: 30.13 KG/M2

## 2023-11-01 DIAGNOSIS — Z01.818 PREOP TESTING: Primary | ICD-10-CM

## 2023-11-01 NOTE — DISCHARGE INSTRUCTIONS
To confirm, Your doctor has instructed you that surgery is scheduled for: 11/3/23 WITH DR. JOEL    Please report to [facility], Registration the morning of surgery. You must check-in and receive a wristband before going to your procedure.  [address] [place] [address] [place] [address]    Pre-Op will call the afternoon prior to surgery between 1:00 and 6:00 PM with the final arrival time.  Phone number: [phone]    PLEASE NOTE:  The surgery schedule has many variables which may affect the time of your surgery case.  Family members should be available if your surgery time changes.  Plan to be here the day of your procedure between 4-6 hours.    MEDICATIONS:  TAKE ONLY THESE MEDICATIONS WITH A SMALL SIP OF WATER THE MORNING OF YOUR PROCEDURE:  SEE LIST    DO NOT TAKE THESE MEDICATIONS 5-7 DAYS PRIOR to your procedure or per your surgeon's request:   ASPIRIN, ALEVE, ADVIL, IBUPROFEN, FISH OIL VITAMIN E, HERBALS  (May take Tylenol)    ONLY if you are prescribed any types of blood thinners such as:  Aspirin, Coumadin, Plavix, Pradaxa, Xarelto, Aggrenox, Effient, Eliquis, Savasya, Brilinta, or any other, ask your surgeon whether you should stop taking them and how long before surgery you should stop.  You may also need to verify with the prescribing physician if it is ok to stop your medication.      INSTRUCTIONS IMPORTANT!!  Do not eat or drink anything between midnight and the time of your procedure- this includes gum, mints, and candy.  Do not smoke or drink alcoholic beverages 24 hours prior to your procedure.  Shower the night before AND the morning of your procedure with a Chlorhexidine wash such as Hibiclens or Dial antibacterial soap from the neck down.  Do not get it on your face or in your eyes.  You may use your own shampoo and face wash. This helps your skin to be as bacteria free as possible.    If you wear contact lenses, dentures, hearing aids or glasses, bring a container to put them  in during surgery and give to a family member for safe keeping.  Please leave all jewelry, piercing's and valuables at home. You must remove your false eyelashes prior to surgery.    DO NOT remove hair from the surgery site.  Do not shave the incision site unless you are given specific instructions to do so.    ONLY if you have been diagnosed with sleep apnea please bring your C-PAP machine.  ONLY if you wear home oxygen please bring your portable oxygen tank the day of your procedure.  ONLY if you have a history of OPEN HEART SURGERY you will need a clearance from your Cardiologist per Anesthesia.      ONLY for patients requiring bowel prep, written instructions will be given by your doctor's office.  ONLY if you have a neuro stimulator, please bring the controller with you the morning of surgery  ONLY if a type and screen test is needed before surgery, please return:  If your doctor has scheduled you for an overnight stay, bring a small overnight bag with any personal items you need.  Make arrangements in advance for transportation home by a responsible adult.  It is not safe to drive a vehicle during the 24 hours after anesthesia.          All  facilities and properties are tobacco free.  Smoking is NOT allowed.   If you have any questions about these instructions, call Pre-Op Admit  Nursing at [phone] or the Pre-Op Day Surgery Unit at [phone].

## 2023-11-02 ENCOUNTER — TELEPHONE (OUTPATIENT)
Dept: BARIATRICS | Facility: CLINIC | Age: 78
End: 2023-11-02
Payer: MEDICARE

## 2023-11-03 ENCOUNTER — PATIENT MESSAGE (OUTPATIENT)
Dept: SURGERY | Facility: CLINIC | Age: 78
End: 2023-11-03
Payer: MEDICARE

## 2023-11-09 ENCOUNTER — PATIENT MESSAGE (OUTPATIENT)
Dept: BARIATRICS | Facility: CLINIC | Age: 78
End: 2023-11-09
Payer: MEDICARE

## 2023-11-13 ENCOUNTER — OFFICE VISIT (OUTPATIENT)
Dept: FAMILY MEDICINE | Facility: CLINIC | Age: 78
End: 2023-11-13
Payer: MEDICARE

## 2023-11-13 VITALS
HEART RATE: 57 BPM | OXYGEN SATURATION: 98 % | DIASTOLIC BLOOD PRESSURE: 78 MMHG | TEMPERATURE: 98 F | BODY MASS INDEX: 31.07 KG/M2 | RESPIRATION RATE: 18 BRPM | WEIGHT: 217 LBS | HEIGHT: 70 IN | SYSTOLIC BLOOD PRESSURE: 114 MMHG

## 2023-11-13 DIAGNOSIS — I35.0 NONRHEUMATIC AORTIC VALVE STENOSIS: ICD-10-CM

## 2023-11-13 DIAGNOSIS — E03.9 ACQUIRED HYPOTHYROIDISM: ICD-10-CM

## 2023-11-13 DIAGNOSIS — E78.00 HYPERCHOLESTEREMIA: Primary | ICD-10-CM

## 2023-11-13 DIAGNOSIS — I35.1 NONRHEUMATIC AORTIC VALVE INSUFFICIENCY: ICD-10-CM

## 2023-11-13 DIAGNOSIS — M17.0 PRIMARY OSTEOARTHRITIS OF BOTH KNEES: ICD-10-CM

## 2023-11-13 PROCEDURE — 99214 OFFICE O/P EST MOD 30 MIN: CPT | Mod: S$PBB,AQ,, | Performed by: FAMILY MEDICINE

## 2023-11-13 PROCEDURE — 99214 PR OFFICE/OUTPT VISIT, EST, LEVL IV, 30-39 MIN: ICD-10-PCS | Mod: S$PBB,AQ,, | Performed by: FAMILY MEDICINE

## 2023-11-13 PROCEDURE — 99213 OFFICE O/P EST LOW 20 MIN: CPT | Performed by: FAMILY MEDICINE

## 2023-11-13 NOTE — PROGRESS NOTES
Subjective:       Patient ID: Earl Baldwin is a 78 y.o. male.    Chief Complaint: Hyperlipidemia      Patient is here for follow-up on cholesterol.  Lab Results       Component                Value               Date                       WBC                      6.20                11/01/2023                 HGB                      14.7                11/01/2023                 HCT                      42.6                11/01/2023                 PLT                      101 (L)             11/01/2023                 CHOL                     209 (H)             08/11/2021                 TRIG                     84                  08/11/2021                 HDL                      54                  08/11/2021                 ALT                      22                  08/11/2021                 AST                      22                  08/11/2021                 NA                       141                 11/01/2023                 K                        4.4                 11/01/2023                 CL                       104                 11/01/2023                 CREATININE               1.4                 11/01/2023                 BUN                      21                  11/01/2023                 CO2                      27                  11/01/2023                 TSH                      0.650               08/11/2021                 PSA                      1.0                 01/14/2010                 HGBA1C                   5.4                 05/14/2019            Lab Results       Component                Value               Date                       CHOL                     209 (H)             08/11/2021                 CHOL                     204 (H)             01/30/2012                 CHOL                     210 (H)             01/14/2010            Lab Results       Component                Value               Date                       HDL                       54                  08/11/2021                 HDL                      40                  01/30/2012                 HDL                      42                  01/14/2010            Lab Results       Component                Value               Date                       LDLCALC                  138.2               08/11/2021                 LDLCALC                  139.0               01/30/2012                 LDLCALC                  143.0 (H)           01/14/2010            Lab Results       Component                Value               Date                       TRIG                     84                  08/11/2021                 TRIG                     125                 01/30/2012                 TRIG                     125                 01/14/2010              Lab Results       Component                Value               Date                       CHOLHDL                  25.8                08/11/2021                 CHOLHDL                  19.6 (L)            01/30/2012                 CHOLHDL                  20.0                01/14/2010            Patient sees urologist in [place] to follow his PSA.  Patient Active Problem List:     Acquired hypothyroidism     Body mass index (BMI) 31.0-31.9, adult      Nonrheumatic aortic valve insufficiency     Nonrheumatic aortic valve stenosis     Other specified spondylopathies, lumbar region     Luis's esophagus     Hyperlipidemia     Primary osteoarthritis involving multiple joints     Capillary angioma     Transient neurological symptoms     Obstructive sleep apnea     Thrombocytopenia, unspecified        Hyperlipidemia  This is a chronic problem. The problem is controlled. Recent lipid tests were reviewed and are normal. He has no history of chronic renal disease, diabetes or liver disease.       Allergies and Medications:   Review of patient's allergies indicates:   Allergen Reactions    Sulfa (sulfonamide antibiotics) Itching and Rash      Current Outpatient Medications   Medication Sig Dispense Refill    ascorbic acid, vitamin C, (VITAMIN C) 1000 MG tablet Take 1,000 mg by mouth once daily.      calcium carbonate/vitamin D3 (VITAMIN D-3 ORAL) Take 10,000 Units by mouth once daily.      magnesium gluconate (MAGONATE) 27.5 mg (500 mg) tablet Take 500 mg by mouth once daily.      prasterone, dhea, (DHEA) 25 mg Tab Take 1 tablet by mouth once daily.      rosuvastatin (CRESTOR) 10 MG tablet Take 10 mg by mouth once daily.      thyroid, pork, (ARMOUR THYROID) 60 mg Tab Take 60 mg by mouth before breakfast.      aspirin (ECOTRIN) 81 MG EC tablet Take 81 mg by mouth once daily.      fish oil-omega-3 fatty acids 300-1,000 mg capsule Take 1,000 mg by mouth once daily.      lysine 500 mg Tab Take 500 mg by mouth once daily.       No current facility-administered medications for this visit.       Family History:   Family History   Problem Relation Age of Onset    No Known Problems Mother     Diabetes Father     Emphysema Father     Diabetes Sister        Social History:   Social History     Socioeconomic History    Marital status:    Tobacco Use    Smoking status: Former     Current packs/day: 0.00     Types: Cigarettes     Quit date: 1979     Years since quittin.8    Smokeless tobacco: Former   Substance and Sexual Activity    Alcohol use: Yes     Alcohol/week: 7.0 standard drinks of alcohol     Types: 7 Glasses of wine per week     Comment: one glass of wine daily    Drug use: No    Sexual activity: Yes     Partners: Female   Social History Narrative    ** Merged History Encounter **          Social Determinants of Health     Financial Resource Strain: Low Risk  (3/6/2023)    Overall Financial Resource Strain (CARDIA)     Difficulty of Paying Living Expenses: Not hard at all   Food Insecurity: No Food Insecurity (3/6/2023)    Hunger Vital Sign     Worried About Running Out of Food in the Last Year: Never true     Ran Out of Food in the  Last Year: Never true   Transportation Needs: No Transportation Needs (3/6/2023)    PRAPARE - Transportation     Lack of Transportation (Medical): No     Lack of Transportation (Non-Medical): No   Physical Activity: Insufficiently Active (3/6/2023)    Exercise Vital Sign     Days of Exercise per Week: 3 days     Minutes of Exercise per Session: 20 min   Stress: No Stress Concern Present (3/6/2023)    [employer] - Occupational Stress Questionnaire     Feeling of Stress : Only a little   Social Connections: Moderately Isolated (3/6/2023)    Social Connection and Isolation Panel [NHANES]     Frequency of Communication with Friends and Family: Once a week     Frequency of Social Gatherings with Friends and Family: Never     Attends Buddhist Services: More than 4 times per year     Active Member of Clubs or Organizations: No     Attends Club or Organization Meetings: Never     Marital Status:    Housing Stability: Low Risk  (3/6/2023)    Housing Stability Vital Sign     Unable to Pay for Housing in the Last Year: No     Number of Places Lived in the Last Year: 1     Unstable Housing in the Last Year: No       Review of Systems    Objective:     Vitals:    11/13/23 1333   BP: 114/78   Pulse: (!) 57   Resp: 18   Temp: 97.8 °F (36.6 °C)        Physical Exam  Vitals and nursing note reviewed.   Constitutional:       Appearance: He is well-developed. He is not diaphoretic.   HENT:      Head: Normocephalic.   Eyes:      Conjunctiva/sclera: Conjunctivae normal.      Pupils: Pupils are equal, round, and reactive to light.   Cardiovascular:      Rate and Rhythm: Normal rate and regular rhythm.      Heart sounds: Murmur (Grade 2/6 systolic ejection murmur) heard.      No friction rub. No gallop.   Pulmonary:      Effort: Pulmonary effort is normal. No respiratory distress.      Breath sounds: Normal breath sounds. No stridor. No wheezing, rhonchi or rales.   Chest:      Chest wall: No  tenderness.   Skin:     General: Skin is warm and dry.   Psychiatric:         Behavior: Behavior normal.         Thought Content: Thought content normal.         Judgment: Judgment normal.       Patient will be having a hernia repair on the 17th of this month  Assessment:       1. Hypercholesteremia    2. Acquired hypothyroidism    3. Nonrheumatic aortic valve insufficiency    4. Nonrheumatic aortic valve stenosis        Plan:       Earl was seen today for hyperlipidemia.    Diagnoses and all orders for this visit:    Hypercholesteremia has been on rosuvastatin 10 mg but has not had cholesterol checked recently.  -     Lipid Panel; Future  -     Comprehensive Metabolic Panel; Future    Acquired hypothyroidism needs to have his thyroid rechecked he is on [place] thyroid 60.  -     T4; Future  -     Cancel: TSH; Future  -     T3; Future  -     T3  -     TSH; Future  -     TSH    Nonrheumatic aortic valve insufficiency    Nonrheumatic aortic valve stenosis         Follow up in about 6 months (around 5/13/2024).

## 2023-11-13 NOTE — PATIENT INSTRUCTIONS
We understand that controlling diabetes can feel overwhelming at times. We are here to offer the tools and support to help you manage your diabetes and meet your health goals. Please reference the meal planning guide below.     Diabetic Meal Planning    About this topic  Healthy eating is an important part of keeping your diabetes in control. A balanced diet along with your diabetic drugs will help you control your blood sugar. The right portions of healthy foods may help keep your sugar within your goal range. It may also help to lower or maintain your weight, manage cholesterol, the amount of fat in your blood, and blood pressure.      Image(s)    What will the results be?  Healthy eating may help you lower your blood sugar and keep it in a safe range. Keeping your blood sugar in a safe range may lower your chances for long term problems from your diabetes. You may be less likely to have damage to your kidneys, heart, eyes, or nerves.    What changes to diet are needed?  Healthy eating means:  Eating a range of foods from all food groups.  Eating the right size portions. Read the nutrition facts on each food you eat.  Eating meals and snacks at the same time each day. Do not skip a meal or snack. Skipping meals may cause your blood sugar to go too low, especially if you are on drugs for your diabetes. Skipping meals can also cause you to eat too much at the next meal.  Talk to your diabetes educator about making a personal meal plan for you. They can also help you eat the foods you like by modifying them to be healthier.    Who should use this diet?  This diet is helpful to people with high blood sugar or diabetes.    What foods are good to eat?  It is important to make a healthy meal. Eat a variety of different foods in the right portion.  Fill half of your plate with non-starchy vegetables. Non-starchy vegetables include: Broccoli, green beans, carrots, greens (ramon, kale, mustard, turnip), onions, tomatoes,  asparagus, beets, cauliflower, celery, and cucumber. Non-starchy vegetables are high in fiber and low in carbohydrates. These will help keep you full for longer without raising your blood sugar.  Fill 1/4 of your plate with carbohydrates. Try to choose whole grain options. Whole-grain products have more fiber which will make you feel full. Carbohydrates include: Bread, rice, pasta, and starchy vegetables. Starchy vegetables include beans, potatoes, acorn squash, butternut squash, corn, and peas.  Fill 1/4 of your plate with protein. Choose low-fat cuts of meat like boneless, skinless chicken breast; pork loin; 90% lean beef; lean and skinless turkey meat; and fresh fish (not fried) such as tuna, salmon, or mackerel.  Add a low-fat or non-fat dairy product like 8 ounces (240 mL) of 1% or skim milk or 6 ounces (180 mL) of low-fat yogurt. Eat the recommended serving. Milk and yogurt have carbohydrates which raise your blood sugar.  Add a small piece of fruit or 1/2 cup (120 grams) of frozen or canned fruit. Choose canned fruits in juice or water. Fruits include apples, bananas, peaches, pears, pineapple, plums, and oranges. Eat the recommended serving. Fruit has carbohydrates which can raise your blood sugar.  Include healthy fats in your meal like olive oil, canola oil, avocado, and nuts.  Other good foods to include in your diet are:  Foods high in fiber. Adding fiber to your meals may help control your blood sugar and cholesterol levels. It may also help with weight loss and prevent belly problems. If you are younger than 50, it is recommended to get 25 to 38 grams per day. If you are older than 50, it is recommended to get 21 to 30 grams per day. Good sources of fiber include:  Nuts and seeds  Beans, peas, and other legumes  Whole-grain products  Fruits  Vegetables  Smart snacks in the right portion. Do not go too long in between meals. Ask your dietitian when you should have a snack in between your meals. Snack on  things like:  Nuts  Vegetables and low-fat dressing  Light popcorn  Low-fat cheese and crackers  1/2 sandwich    What foods should be limited or avoided?  You may need to limit the amount of some foods you eat and how often you eat them. Foods that should be limited include:  High fat or processed foods like:  Cardenas  Sausage  Hot dogs  Whole-fat dairy products  Potato chips  Foods high in sodium like:  Canned soups  Soy sauce and some salad dressings  Cured meats  Salted snack foods like pretzels  Olives  Fats and oils like:  Margarine  Salad dressing  Gravy  Lard or shortening  Foods high in sugar like:  Candy  Cookies  Cake  Ice cream  Table sugar  Soda  Juice drinks  Starches that are not whole grain like:  White rice  French fries  White pasta  White bread  Sugary cereals  Baked goods, pastries, croissants  Beer, wine, and mixed drinks (alcohol). Drink alcohol in moderation (1 drink per day or less for adult women and 2 drinks per day or less for adult men). Drinking alcohol can cause fluctuations in your blood sugar and interfere with how your diabetic drugs work. Talk to your doctor about how you can safely include alcohol into your diet.    What can be done to prevent this health problem?  Some people have a higher chance of developing diabetes than others. This is a life-long problem. You can still lead a normal life. Diabetes can be managed through diet, exercise, and drugs. It is important to have support from your family and friends to help you with your goals.    When do I need to call the doctor?  Blood sugar level is above 240 mg/dL for more than a day  Blood sugar level drops to less than 40 and does not respond to 15 grams of simple carbohydrate, like 4 glucose tablets or 1/2 cup (120 mL) of fruit juice  Trouble breathing  Very sleepy and trouble concentrating  Feeling very thirsty  Needing to urinate (pee) more than normal  Throw up more than once or have many loose stools  You are so sick you  cannot eat or drink  Fever over 101°F (38°C)  Questions about your diet plan  You are not feeling better in 2 to 3 days or you are feeling worse    Helpful tips  Plan ahead. Plan your meals and grocery list before going to the store. This will help you to choose foods that are good for you.  Pack a lunch. Take healthy meals and snacks with you to work.  Keep a food journal to help keep you on track. Take note of foods that keep your blood sugar in goal range. Also note foods and meals that raise or lower your blood sugar. There are apps for your phone that can help with this.  Visit a restaurant's website before eating out. You can see the menu options and nutritional facts. This way, you can see which items best fit into your diet plan. Call ahead if you have questions.    Disclaimer.This generalized information is a limited summary of diagnosis, treatment, and/or medication information. It is not meant to be comprehensive and should be used as a tool to help the user understand and/or assess potential diagnostic and treatment options. It does NOT include all information about conditions, treatments, medications, side effects, or risks that may apply to a specific patient. It is not intended to be medical advice or a substitute for the medical advice, diagnosis, or treatment of a health care provider based on the health care provider's examination and assessment of a patient's specific and unique circumstances. Patients must speak with a health care provider for complete information about their health, medical questions, and treatment options, including any risks or benefits regarding use of medications. This information does not endorse any treatments or medications as safe, effective, or approved for treating a specific patient. [employer] and its affiliates disclaim any warranty or liability relating to this information or the use thereof. The use of this information is governed by the Terms of Use,  available at Terms of Use. ©2022 [employer] and its affiliates and/or licensors. All rights reserved. Avoid anything with sugar in the 1st 3 ingredients including honey and syrup.  Avoid dried fruits like raise nodes and apricots.  Other fruits and vegetables are okay but the sugar content is higher in bananas and grapes.  Avoid large portions on your starchy foods:  Bread rice potatoes and pasta.

## 2023-11-15 ENCOUNTER — PATIENT MESSAGE (OUTPATIENT)
Dept: FAMILY MEDICINE | Facility: CLINIC | Age: 78
End: 2023-11-15

## 2023-11-15 ENCOUNTER — PATIENT MESSAGE (OUTPATIENT)
Dept: NEUROLOGY | Facility: CLINIC | Age: 78
End: 2023-11-15
Payer: MEDICARE

## 2023-11-16 ENCOUNTER — ANESTHESIA EVENT (OUTPATIENT)
Dept: SURGERY | Facility: HOSPITAL | Age: 78
End: 2023-11-16
Payer: MEDICARE

## 2023-11-16 ENCOUNTER — TELEPHONE (OUTPATIENT)
Dept: FAMILY MEDICINE | Facility: CLINIC | Age: 78
End: 2023-11-16

## 2023-11-17 ENCOUNTER — HOSPITAL ENCOUNTER (OUTPATIENT)
Facility: HOSPITAL | Age: 78
Discharge: HOME | End: 2023-11-17
Attending: SURGERY | Admitting: SURGERY
Payer: MEDICARE

## 2023-11-17 ENCOUNTER — ANESTHESIA (OUTPATIENT)
Dept: SURGERY | Facility: HOSPITAL | Age: 78
End: 2023-11-17
Payer: MEDICARE

## 2023-11-17 VITALS
OXYGEN SATURATION: 95 % | TEMPERATURE: 98 F | SYSTOLIC BLOOD PRESSURE: 102 MMHG | RESPIRATION RATE: 17 BRPM | HEART RATE: 58 BPM | DIASTOLIC BLOOD PRESSURE: 55 MMHG | HEIGHT: 70 IN | WEIGHT: 210 LBS | BODY MASS INDEX: 30.06 KG/M2

## 2023-11-17 DIAGNOSIS — Z01.818 PREOP TESTING: ICD-10-CM

## 2023-11-17 DIAGNOSIS — K40.90 INGUINAL HERNIA: Primary | ICD-10-CM

## 2023-11-17 PROCEDURE — 49505 PR REPAIR ING HERNIA,5+Y/O,REDUCIBL: ICD-10-PCS | Mod: LT,,, | Performed by: SURGERY

## 2023-11-17 PROCEDURE — 71000016 HC POSTOP RECOV ADDL HR: Performed by: SURGERY

## 2023-11-17 PROCEDURE — 94799 UNLISTED PULMONARY SVC/PX: CPT | Mod: XB

## 2023-11-17 PROCEDURE — 63600175 PHARM REV CODE 636 W HCPCS: Performed by: SURGERY

## 2023-11-17 PROCEDURE — 49505 PRP I/HERN INIT REDUC >5 YR: CPT | Mod: LT,,, | Performed by: SURGERY

## 2023-11-17 PROCEDURE — 71000033 HC RECOVERY, INTIAL HOUR: Performed by: SURGERY

## 2023-11-17 PROCEDURE — 25000003 PHARM REV CODE 250: Performed by: NURSE ANESTHETIST, CERTIFIED REGISTERED

## 2023-11-17 PROCEDURE — D9220A PRA ANESTHESIA: Mod: ANES,,, | Performed by: ANESTHESIOLOGY

## 2023-11-17 PROCEDURE — D9220A PRA ANESTHESIA: Mod: CRNA,,, | Performed by: NURSE ANESTHETIST, CERTIFIED REGISTERED

## 2023-11-17 PROCEDURE — C1781 MESH (IMPLANTABLE): HCPCS | Performed by: SURGERY

## 2023-11-17 PROCEDURE — 71000039 HC RECOVERY, EACH ADD'L HOUR: Performed by: SURGERY

## 2023-11-17 PROCEDURE — 37000008 HC ANESTHESIA 1ST 15 MINUTES: Performed by: SURGERY

## 2023-11-17 PROCEDURE — 71000015 HC POSTOP RECOV 1ST HR: Performed by: SURGERY

## 2023-11-17 PROCEDURE — D9220A PRA ANESTHESIA: ICD-10-PCS | Mod: ANES,,, | Performed by: ANESTHESIOLOGY

## 2023-11-17 PROCEDURE — 99900031 HC PATIENT EDUCATION (STAT)

## 2023-11-17 PROCEDURE — 25000003 PHARM REV CODE 250: Performed by: ANESTHESIOLOGY

## 2023-11-17 PROCEDURE — D9220A PRA ANESTHESIA: ICD-10-PCS | Mod: CRNA,,, | Performed by: NURSE ANESTHETIST, CERTIFIED REGISTERED

## 2023-11-17 PROCEDURE — 36000710: Performed by: SURGERY

## 2023-11-17 PROCEDURE — 37000009 HC ANESTHESIA EA ADD 15 MINS: Performed by: SURGERY

## 2023-11-17 PROCEDURE — 36000711: Performed by: SURGERY

## 2023-11-17 PROCEDURE — 63600175 PHARM REV CODE 636 W HCPCS: Performed by: ANESTHESIOLOGY

## 2023-11-17 PROCEDURE — 63600175 PHARM REV CODE 636 W HCPCS: Performed by: NURSE ANESTHETIST, CERTIFIED REGISTERED

## 2023-11-17 RX ORDER — ONDANSETRON 2 MG/ML
INJECTION INTRAMUSCULAR; INTRAVENOUS
Status: DISCONTINUED | OUTPATIENT
Start: 2023-11-17 | End: 2023-11-17

## 2023-11-17 RX ORDER — LIDOCAINE HYDROCHLORIDE 10 MG/ML
1 INJECTION, SOLUTION EPIDURAL; INFILTRATION; INTRACAUDAL; PERINEURAL ONCE
Status: DISCONTINUED | OUTPATIENT
Start: 2023-11-17 | End: 2023-11-17 | Stop reason: HOSPADM

## 2023-11-17 RX ORDER — HYDROCODONE BITARTRATE AND ACETAMINOPHEN 7.5; 325 MG/1; MG/1
1 TABLET ORAL EVERY 4 HOURS PRN
Qty: 20 TABLET | Refills: 0 | Status: SHIPPED | OUTPATIENT
Start: 2023-11-17 | End: 2023-11-29

## 2023-11-17 RX ORDER — SUCCINYLCHOLINE CHLORIDE 20 MG/ML
INJECTION INTRAMUSCULAR; INTRAVENOUS
Status: DISCONTINUED | OUTPATIENT
Start: 2023-11-17 | End: 2023-11-17

## 2023-11-17 RX ORDER — DEXAMETHASONE SODIUM PHOSPHATE 4 MG/ML
INJECTION, SOLUTION INTRA-ARTICULAR; INTRALESIONAL; INTRAMUSCULAR; INTRAVENOUS; SOFT TISSUE
Status: DISCONTINUED | OUTPATIENT
Start: 2023-11-17 | End: 2023-11-17

## 2023-11-17 RX ORDER — HYDROMORPHONE HYDROCHLORIDE 2 MG/ML
INJECTION, SOLUTION INTRAMUSCULAR; INTRAVENOUS; SUBCUTANEOUS
Status: DISCONTINUED | OUTPATIENT
Start: 2023-11-17 | End: 2023-11-17

## 2023-11-17 RX ORDER — ACETAMINOPHEN 10 MG/ML
INJECTION, SOLUTION INTRAVENOUS
Status: DISCONTINUED | OUTPATIENT
Start: 2023-11-17 | End: 2023-11-17

## 2023-11-17 RX ORDER — BUPIVACAINE HYDROCHLORIDE 2.5 MG/ML
INJECTION, SOLUTION EPIDURAL; INFILTRATION; INTRACAUDAL
Status: DISCONTINUED | OUTPATIENT
Start: 2023-11-17 | End: 2023-11-17 | Stop reason: HOSPADM

## 2023-11-17 RX ORDER — LIDOCAINE HYDROCHLORIDE 20 MG/ML
INJECTION, SOLUTION EPIDURAL; INFILTRATION; INTRACAUDAL; PERINEURAL
Status: DISCONTINUED | OUTPATIENT
Start: 2023-11-17 | End: 2023-11-17

## 2023-11-17 RX ORDER — METOCLOPRAMIDE HYDROCHLORIDE 5 MG/ML
10 INJECTION INTRAMUSCULAR; INTRAVENOUS EVERY 10 MIN PRN
Status: COMPLETED | OUTPATIENT
Start: 2023-11-17 | End: 2023-11-17

## 2023-11-17 RX ORDER — FENTANYL CITRATE 50 UG/ML
25 INJECTION, SOLUTION INTRAMUSCULAR; INTRAVENOUS EVERY 5 MIN PRN
Status: DISCONTINUED | OUTPATIENT
Start: 2023-11-17 | End: 2023-11-17 | Stop reason: HOSPADM

## 2023-11-17 RX ORDER — ONDANSETRON 4 MG/1
4 TABLET, ORALLY DISINTEGRATING ORAL EVERY 6 HOURS PRN
Qty: 30 TABLET | Refills: 0 | Status: SHIPPED | OUTPATIENT
Start: 2023-11-17

## 2023-11-17 RX ORDER — HYDROMORPHONE HYDROCHLORIDE 2 MG/ML
0.2 INJECTION, SOLUTION INTRAMUSCULAR; INTRAVENOUS; SUBCUTANEOUS EVERY 5 MIN PRN
Status: DISCONTINUED | OUTPATIENT
Start: 2023-11-17 | End: 2023-11-17 | Stop reason: HOSPADM

## 2023-11-17 RX ORDER — FENTANYL CITRATE 50 UG/ML
INJECTION, SOLUTION INTRAMUSCULAR; INTRAVENOUS
Status: DISCONTINUED | OUTPATIENT
Start: 2023-11-17 | End: 2023-11-17

## 2023-11-17 RX ORDER — ROCURONIUM BROMIDE 10 MG/ML
INJECTION, SOLUTION INTRAVENOUS
Status: DISCONTINUED | OUTPATIENT
Start: 2023-11-17 | End: 2023-11-17

## 2023-11-17 RX ORDER — PROPOFOL 10 MG/ML
VIAL (ML) INTRAVENOUS
Status: DISCONTINUED | OUTPATIENT
Start: 2023-11-17 | End: 2023-11-17

## 2023-11-17 RX ORDER — CEFAZOLIN SODIUM 2 G/50ML
2 SOLUTION INTRAVENOUS ONCE
Status: COMPLETED | OUTPATIENT
Start: 2023-11-17 | End: 2023-11-17

## 2023-11-17 RX ORDER — SODIUM CHLORIDE 9 MG/ML
INJECTION, SOLUTION INTRAVENOUS CONTINUOUS
Status: DISCONTINUED | OUTPATIENT
Start: 2023-11-17 | End: 2023-11-17 | Stop reason: HOSPADM

## 2023-11-17 RX ORDER — OXYCODONE HYDROCHLORIDE 5 MG/1
5 TABLET ORAL
Status: DISCONTINUED | OUTPATIENT
Start: 2023-11-17 | End: 2023-11-17 | Stop reason: HOSPADM

## 2023-11-17 RX ADMIN — ROCURONIUM BROMIDE 5 MG: 10 INJECTION, SOLUTION INTRAVENOUS at 07:11

## 2023-11-17 RX ADMIN — METOCLOPRAMIDE 10 MG: 5 INJECTION, SOLUTION INTRAMUSCULAR; INTRAVENOUS at 10:11

## 2023-11-17 RX ADMIN — SODIUM CHLORIDE, SODIUM GLUCONATE, SODIUM ACETATE, POTASSIUM CHLORIDE AND MAGNESIUM CHLORIDE: 526; 502; 368; 37; 30 INJECTION, SOLUTION INTRAVENOUS at 06:11

## 2023-11-17 RX ADMIN — ROCURONIUM BROMIDE 20 MG: 10 INJECTION, SOLUTION INTRAVENOUS at 07:11

## 2023-11-17 RX ADMIN — ROCURONIUM BROMIDE 20 MG: 10 INJECTION, SOLUTION INTRAVENOUS at 08:11

## 2023-11-17 RX ADMIN — OXYCODONE HYDROCHLORIDE 5 MG: 5 TABLET ORAL at 10:11

## 2023-11-17 RX ADMIN — PROPOFOL 150 MG: 10 INJECTION, EMULSION INTRAVENOUS at 07:11

## 2023-11-17 RX ADMIN — SUGAMMADEX 200 MG: 100 INJECTION, SOLUTION INTRAVENOUS at 09:11

## 2023-11-17 RX ADMIN — FENTANYL CITRATE 25 MCG: 50 INJECTION, SOLUTION INTRAMUSCULAR; INTRAVENOUS at 09:11

## 2023-11-17 RX ADMIN — HYDROMORPHONE HYDROCHLORIDE 0.2 MG: 2 INJECTION INTRAMUSCULAR; INTRAVENOUS; SUBCUTANEOUS at 09:11

## 2023-11-17 RX ADMIN — ROCURONIUM BROMIDE 25 MG: 10 INJECTION, SOLUTION INTRAVENOUS at 08:11

## 2023-11-17 RX ADMIN — ACETAMINOPHEN 1000 MG: 10 INJECTION, SOLUTION INTRAVENOUS at 09:11

## 2023-11-17 RX ADMIN — SUCCINYLCHOLINE CHLORIDE 120 MG: 20 INJECTION, SOLUTION INTRAMUSCULAR; INTRAVENOUS at 07:11

## 2023-11-17 RX ADMIN — ONDANSETRON 4 MG: 2 INJECTION INTRAMUSCULAR; INTRAVENOUS at 07:11

## 2023-11-17 RX ADMIN — LIDOCAINE HYDROCHLORIDE 100 MG: 20 INJECTION, SOLUTION EPIDURAL; INFILTRATION; INTRACAUDAL at 07:11

## 2023-11-17 RX ADMIN — FENTANYL CITRATE 25 MCG: 50 INJECTION, SOLUTION INTRAMUSCULAR; INTRAVENOUS at 08:11

## 2023-11-17 RX ADMIN — FENTANYL CITRATE 25 MCG: 50 INJECTION INTRAMUSCULAR; INTRAVENOUS at 10:11

## 2023-11-17 RX ADMIN — CEFAZOLIN SODIUM 2 G: 2 SOLUTION INTRAVENOUS at 07:11

## 2023-11-17 RX ADMIN — FENTANYL CITRATE 50 MCG: 50 INJECTION, SOLUTION INTRAMUSCULAR; INTRAVENOUS at 07:11

## 2023-11-17 RX ADMIN — DEXAMETHASONE SODIUM PHOSPHATE 4 MG: 4 INJECTION, SOLUTION INTRA-ARTICULAR; INTRALESIONAL; INTRAMUSCULAR; INTRAVENOUS; SOFT TISSUE at 07:11

## 2023-11-17 NOTE — ANESTHESIA PREPROCEDURE EVALUATION
11/17/2023  Earl Baldwin is a 78 y.o., male.      Pre-op Assessment    I have reviewed the Patient Summary Reports.     I have reviewed the Nursing Notes. I have reviewed the NPO Status.   I have reviewed the Medications.     Review of Systems  Cardiovascular:     Hypertension Valvular problems/Murmurs             ECG has been reviewed. · Moderate concentric left ventricular hypertrophy.  · Normal left ventricular systolic function. The estimated ejection fraction is 57%  · Normal LV diastolic function.  · Normal right ventricular systolic function.  · Moderate aortic regurgitation.  · Moderate aortic valve stenosis.  · Aortic valve area is 1.49 cm2; peak velocity is 2.24 m/s; mean gradient is 13.13 mmHg.  · Mild tricuspid regurgitation.  · Normal central venous pressure (3 mm Hg).  · The estimated PA systolic pressure is 14 mm Hg  · Moderate calcific aortic valve stenosis with decreased Right aortic valve cusp motion                              Pulmonary:        Sleep Apnea                Renal/:  Renal/ Normal                 Hepatic/GI:  Hepatic/GI Normal                 Musculoskeletal:  Arthritis               Endocrine:   Hypothyroidism              Physical Exam  General: Well nourished    Airway:  Mallampati: II   Neck ROM: Normal ROM    Dental:  Intact        Anesthesia Plan  Type of Anesthesia, risks & benefits discussed:    Anesthesia Type: Gen ETT  Intra-op Monitoring Plan: Standard ASA Monitors  Post Op Pain Control Plan: multimodal analgesia and IV/PO Opioids PRN  Induction:  IV and Inhalation  Informed Consent: Informed consent signed with the Patient and all parties understand the risks and agree with anesthesia plan.  All questions answered.   ASA Score: 3    Ready For Surgery From Anesthesia Perspective.     .

## 2023-11-17 NOTE — OP NOTE
Robotic Left inguinal hernia repair with mesh  Procedure Note    Date of procedure:   11/17/2023    Indications: 77 y/o with left inguinal hernia.    Pre-operative Diagnosis: unilateral non recurrent inguinal hernia    Post-operative Diagnosis: Same    Surgeon: Sugey Fagan MD    Assistants: none    Anesthesia: General endotracheal anesthesia    ASA Class: 3    Procedure Details   The patient was seen in the Holding Room. The risks, benefits, complications, treatment options, and expected outcomes were discussed with the patient. The possibilities of reaction to medication, pulmonary aspiration, perforation of viscus, bleeding, recurrent infection, the need for additional procedures, failure to diagnose a condition, and creating a complication requiring transfusion or operation were discussed with the patient. The patient concurred with the proposed plan, giving informed consent. The site of surgery properly noted/marked. The patient was taken to Operating Room 5 identified as Earl Baldwin and the procedure verified as robotic inguinal hernia with mesh. A Time Out was held and the above information confirmed.    Full general anesthesia was induced with orotracheal intubation. The patient was prepped and draped in a supine position. Appropriate antibiotics were given intravenously. Arms were out.    Aretha umbilical incision was made cautery used to expose umbilical hernia.  Hernia sac was dissected then transected.  Trocar was then inserted directly into the peritoneal cavity and CO2 insufflation was started maintain without adverse change in vital signs.      Patient was placed in appropriate positioning and using the robotic trocar the trocars were placed under direct visualization.  Robot was then docked to the trocar was and the procedure started.      As space was then created in the peritoneum approximately 6 cm above the hernia defect.  Using scissors and graspers the plane was extended inferiorly to  the hernia defect.  The medial aspect was dissected 1st allowing the bladder to drop posteriorly and maintaining the plane below the epigastric vessels.  The Jimi's ligament was exposed and then dissection proceeded laterally.  A wide lateral space was created in the preperitoneal plane avoiding any injury to surrounding structures.  The hernia was identified and appeared to be an indirect inguinal hernia going with the spermatic cord.  This was grasped and pulled into the preperitoneal space.  It was dissected free from the spermatic cord.  The vas deferens was identified spared during this process.  The spermatic cord was identified and none of the vessels were injured during the process.  Once the inguinal hernia sac was completely removed from the spermatic cord and confirmed by investigating the peritoneal space, a appropriately sized piece of textile mesh was placed into the peritoneal cavity along with the V lock suture and Vicryl.  The mesh was placed over the hernia defects and adequately covered the hernia defect on all sides and had good medial coverage.  Vicryl sutures were used to tack the mesh in place medially and anteriorly.  This avoided any sutures in the spaces with vessels or nerves.  Once this was done the peritoneum was closed with an absorbable V lock suture.  CO2 insufflation was then stopped and needles were removed.  Trocars removed and the umbilical hernia defect was closed using a Vicryl suture.  Skin was then closed with Monocryl.  Dressings were placed.      Patient tolerated the procedure well.      Instrument, sponge, and needle counts were correct prior to wound closure and at the conclusion of the case.     Findings:  indirect inguinal hernia on left with bowel, no right inguinal hernia    Estimated Blood Loss: 20.0 cc    Drains: none    Total IV Fluids: see anesthesia    Specimens: none    Implants: Dextile mesh    Complications:  None; patient tolerated the procedure  well.    Disposition: PACU - hemodynamically stable.    Condition: stable    Attending Attestation: I was present and scrubbed for the entire procedure.

## 2023-11-17 NOTE — PLAN OF CARE
Patient voided and is ambulating in the hallway with spouse and walker. Discharge instructions re-inforced, all valuables returned, and waiting of daughter for ride home. Both verbalized understanding of instructions provided

## 2023-11-17 NOTE — DISCHARGE INSTRUCTIONS
Ice to left groin every 8 hours for the next 48 hoursPost op instructions for prevention of DVT  What is deep vein thrombosis?  Deep vein thrombosis (DVT) is the medical term for blood clots in the deep veins of the leg.  These blood clots can be dangerous.  A DVT can block a blood vessel and keep blood from getting where it needs to go.  Another problem is that the clot can travel to other parts of the body such as the lungs.  A clot that travels to the lungs is called a pulmonary embolus (PE) and can cause serious problems with breathing which can lead to death.  Am I at risk for DVT/PE?  If you are not very active, you are at risk of DVT.  Anyone confined to bed, sitting for long periods of time, recovering from surgery, etc. increases the risk of DVT.  Other risk factors are cancer diagnosis, certain medications, estrogen replacement in any form,older age, obesity, pregnancy, smoking, history of clotting disorders, and dehydration.  How will I know if I have a DVT?  Swelling in the lower leg  Pain  Warmth, redness, hardness or bulging of the vein  If you have any of these symptoms, call your doctors office right away.  Some people will not have any symptoms until the clot moves to the lungs.  What are the symptoms of a PE?  Panting, shortness of breath, or trouble breathing  Sharp, knife-like chest pain when you breathe  Coughing or coughing up blood  Rapid heartbeat  If you have any of these symptoms or get worse quickly, call 911 for emergency treatment.  How can I prevent a DVT?  Avoid long periods of inactivity and dont cross your legs--get up and walk around every hour or so.  Stay active--walking after surgery is highly encouraged.  This means you should get out of the house and walk in the neighborhood.  Going up and down stairs will not impair healing (unless advised against such activity by your doctor).    Drink plenty of noncaffeinated, nonalcoholic fluids each day to prevent dehydration.  Wear  "special support stockings, if they have been advised by your doctor.  If you travel, stop at least once an hour and walk around.  Avoid smoking (assistance with stopping is available through your healthcare provider)  Always notify your doctor if you are not able to follow the post operative instructions that are given to you at the time of discharge.  It may be necessary to prescribe one of the medications available to prevent DVT.  Discharge Instructions: After Your Surgery/Procedure  Youve just had surgery. During surgery you were given medicine called anesthesia to keep you relaxed and free of pain. After surgery you may have some pain or nausea. This is common. Here are some tips for feeling better and getting well after surgery.     Stay on schedule with your medication.   Going home  Your doctor or nurse will show you how to take care of yourself when you go home. He or she will also answer your questions. Have an adult family member or friend drive you home.      For your safety we recommend these precaution for the first 24 hours after your procedure:  Do not drive or use heavy equipment.  Do not make important decisions or sign legal papers.  Do not drink alcohol.  Have someone stay with you, if needed. He or she can watch for problems and help keep you safe.  Your concentration, balance, coordination, and judgement may be impaired for many hours after anesthesia.  Use caution when ambulating or standing up.     You may feel weak and "washed out" after anesthesia and surgery.      Subtle residual effects of general anesthesia or sedation with regional / local anesthesia can last more than 24 hours.  Rest for the remainder of the day or longer if your Doctor/Surgeon has advised you to do so.  Although you may feel normal within the first 24 hours, your reflexes and mental ability may be impaired without you realizing it.  You may feel dizzy, lightheaded or sleepy for 24 hours or longer.      Be sure to go " to all follow-up visits with your doctor. And rest after your surgery for as long as your doctor tells you to.  Coping with pain  If you have pain after surgery, pain medicine will help you feel better. Take it as told, before pain becomes severe. Also, ask your doctor or pharmacist about other ways to control pain. This might be with heat, ice, or relaxation. And follow any other instructions your surgeon or nurse gives you.  Tips for taking pain medicine  To get the best relief possible, remember these points:  Pain medicines can upset your stomach. Taking them with a little food may help.  Most pain relievers taken by mouth need at least 20 to 30 minutes to start to work.  Taking medicine on a schedule can help you remember to take it. Try to time your medicine so that you can take it before starting an activity. This might be before you get dressed, go for a walk, or sit down for dinner.  Constipation is a common side effect of pain medicines. Call your doctor before taking any medicines such as laxatives or stool softeners to help ease constipation. Also ask if you should skip any foods. Drinking lots of fluids and eating foods such as fruits and vegetables that are high in fiber can also help. Remember, do not take laxatives unless your surgeon has prescribed them.  Drinking alcohol and taking pain medicine can cause dizziness and slow your breathing. It can even be deadly. Do not drink alcohol while taking pain medicine.  Pain medicine can make you react more slowly to things. Do not drive or run machinery while taking pain medicine.  Your health care provider may tell you to take acetaminophen to help ease your pain. Ask him or her how much you are supposed to take each day. Acetaminophen or other pain relievers may interact with your prescription medicines or other over-the-counter (OTC) drugs. Some prescription medicines have acetaminophen and other ingredients. Using both prescription and OTC  acetaminophen for pain can cause you to overdose. Read the labels on your OTC medicines with care. This will help you to clearly know the list of ingredients, how much to take, and any warnings. It may also help you not take too much acetaminophen. If you have questions or do not understand the information, ask your pharmacist or health care provider to explain it to you before you take the OTC medicine.  Managing nausea  Some people have an upset stomach after surgery. This is often because of anesthesia, pain, or pain medicine, or the stress of surgery. These tips will help you handle nausea and eat healthy foods as you get better. If you were on a special food plan before surgery, ask your doctor if you should follow it while you get better. These tips may help:  Do not push yourself to eat. Your body will tell you when to eat and how much.  Start off with clear liquids and soup. They are easier to digest.  Next try semi-solid foods, such as mashed potatoes, applesauce, and gelatin, as you feel ready.  Slowly move to solid foods. Dont eat fatty, rich, or spicy foods at first.  Do not force yourself to have 3 large meals a day. Instead eat smaller amounts more often.  Take pain medicines with a small amount of solid food, such as crackers or toast, to avoid nausea.     Call your surgeon if  You still have pain an hour after taking medicine. The medicine may not be strong enough.  You feel too sleepy, dizzy, or groggy. The medicine may be too strong.  You have side effects like nausea, vomiting, or skin changes, such as rash, itching, or hives.       If you have obstructive sleep apnea  You were given anesthesia medicine during surgery to keep you comfortable and free of pain. After surgery, you may have more apnea spells because of this medicine and other medicines you were given. The spells may last longer than usual.   At home:  Keep using the continuous positive airway pressure (CPAP) device when you sleep.  Unless your health care provider tells you not to, use it when you sleep, day or night. CPAP is a common device used to treat obstructive sleep apnea.  Talk with your provider before taking any pain medicine, muscle relaxants, or sedatives. Your provider will tell you about the possible dangers of taking these medicines.  © 8497-5636 [employer]. [address] [place] [address], [place] [address]. All rights reserved. This information is not intended as a substitute for professional medical care. Always follow your healthcare professional's instructions.

## 2023-11-17 NOTE — TRANSFER OF CARE
"Anesthesia Transfer of Care Note    Patient: Earl Baldwin    Procedure(s) Performed: Procedure(s) (LRB):  ROBOTIC REPAIR, HERNIA, INGUINAL (Left)    Patient location: PACU    Anesthesia Type: general    Transport from OR: Transported from OR on room air with adequate spontaneous ventilation    Post pain: adequate analgesia    Post assessment: no apparent anesthetic complications and tolerated procedure well    Post vital signs: stable    Level of consciousness: awake and responds to stimulation    Nausea/Vomiting: no nausea/vomiting    Complications: none    Transfer of care protocol was followed      Last vitals: Visit Vitals  BP (!) 145/74 (BP Location: Right arm, Patient Position: Lying)   Pulse 65   Temp 37.1 °C (98.8 °F) (Tympanic)   Resp 17   Ht 5' 10" (1.778 m)   Wt 95.3 kg (210 lb)   SpO2 95%   BMI 30.13 kg/m²     "

## 2023-11-17 NOTE — DISCHARGE SUMMARY
[facility]  Discharge Note  Short Stay    Procedure(s) (LRB):  ROBOTIC REPAIR, HERNIA, INGUINAL (Left)      OUTCOME: Patient tolerated treatment/procedure well without complication and is now ready for discharge.    DISPOSITION: Home or Self Care    FINAL DIAGNOSIS:  non recurrent, unilateral inguinal hernia    FOLLOWUP: In clinic    DISCHARGE INSTRUCTIONS:    Discharge Procedure Orders   Diet Adult Regular     Ice to affected area     Notify your health care provider if you experience any of the following:  temperature >100.4     Notify your health care provider if you experience any of the following:  persistent nausea and vomiting or diarrhea     Notify your health care provider if you experience any of the following:  severe uncontrolled pain     Notify your health care provider if you experience any of the following:  redness, tenderness, or signs of infection (pain, swelling, redness, odor or green/yellow discharge around incision site)     No dressing needed     Weight bearing restrictions (specify):        TIME SPENT ON DISCHARGE: 2 minutes

## 2023-11-17 NOTE — PLAN OF CARE
Report to Raya. Patient denies pain, no nausea, dressing to abdomen dry intact no drainage, vs stable, resting comfortably, wife in attendancr

## 2023-11-17 NOTE — ANESTHESIA PROCEDURE NOTES
Intubation    Date/Time: 11/17/2023 7:39 AM    Performed by: Kaity Kerr CRNA  Authorized by: Pj Patrick MD    Intubation:     Induction:  Intravenous    Intubated:  Postinduction    Mask Ventilation:  Easy mask    Attempts:  1    Attempted By:  CRNA    Method of Intubation:  Video laryngoscopy    Blade:  Brink 3    Laryngeal View Grade: Grade I - full view of cords      Difficult Airway Encountered?: No      Complications:  None    Airway Device:  Oral endotracheal tube    Airway Device Size:  8.0    Style/Cuff Inflation:  Cuffed (inflated to minimal occlusive pressure)    Tube secured:  23    Secured at:  The lips    Placement Verified By:  Capnometry    Complicating Factors:  None    Findings Post-Intubation:  BS equal bilateral and atraumatic/condition of teeth unchanged

## 2023-11-17 NOTE — ANESTHESIA POSTPROCEDURE EVALUATION
Anesthesia Post Evaluation    Patient: Earl Baldwin    Procedure(s) Performed: Procedure(s) (LRB):  ROBOTIC REPAIR, HERNIA, INGUINAL (Left)    Final Anesthesia Type: general      Patient location during evaluation: PACU  Patient participation: Yes- Able to Participate  Level of consciousness: sedated and awake  Post-procedure vital signs: reviewed and stable  Pain management: adequate  Airway patency: patent    PONV status at discharge: No PONV  Anesthetic complications: no      Cardiovascular status: blood pressure returned to baseline  Respiratory status: spontaneous ventilation  Hydration status: euvolemic  Follow-up not needed.          Vitals Value Taken Time   /63 11/17/23 1100   Temp 36.4 °C (97.6 °F) 11/17/23 1050   Pulse 60 11/17/23 1100   Resp 18 11/17/23 1100   SpO2 99 % 11/17/23 1051   Vitals shown include unvalidated device data.      Event Time   Out of Recovery 11:00:00         Pain/Ada Score: Pain Rating Prior to Med Admin: 3 (11/17/2023 10:50 AM)  Pain Rating Post Med Admin: 5 (11/17/2023 10:15 AM)  Ada Score: 10 (11/17/2023 11:00 AM)

## 2023-11-18 ENCOUNTER — NURSE TRIAGE (OUTPATIENT)
Dept: ADMINISTRATIVE | Facility: CLINIC | Age: 78
End: 2023-11-18
Payer: MEDICARE

## 2023-11-18 ENCOUNTER — HOSPITAL ENCOUNTER (EMERGENCY)
Facility: HOSPITAL | Age: 78
Discharge: HOME | End: 2023-11-18
Attending: EMERGENCY MEDICINE
Payer: MEDICARE

## 2023-11-18 VITALS
WEIGHT: 210.13 LBS | DIASTOLIC BLOOD PRESSURE: 66 MMHG | RESPIRATION RATE: 16 BRPM | OXYGEN SATURATION: 98 % | BODY MASS INDEX: 30.15 KG/M2 | HEART RATE: 64 BPM | SYSTOLIC BLOOD PRESSURE: 130 MMHG | TEMPERATURE: 98 F

## 2023-11-18 DIAGNOSIS — M79.662 PAIN OF LEFT CALF: ICD-10-CM

## 2023-11-18 PROCEDURE — 99284 EMERGENCY DEPT VISIT MOD MDM: CPT | Mod: 25

## 2023-11-18 NOTE — TELEPHONE ENCOUNTER
[place]    PCP:  Dr. Payam Noonan    Pt escalated to Kristina gerard.  S/P Lt Robotic Inguinal Hernia Repair with Dr. Shelby Fagan.  C/O 1/4 in difference in Lt calf, decreased activity, Lt calf pain, and - Kemi's sign.  Denies fever, drainage, and signs of infection.  He is able to walk but painful when he first starts to walk that improves after walking but is still painful.  Per protocol, care advised is see HCP within 4 hrs.  NT spoke with OCP, Dr. Gay.  OCP states no way to get an outpatient US until Monday and if they want it done urgently then they will have to go to the ED.  NT spoke with Pt and Dtr and notified of OCP recommendations.  Pt and Dtr VU and he will go to the ED.  Advised to call for worsening/questions/concerns.  VU.    Reason for Disposition   [1] Thigh or calf pain AND [2] only 1 side AND [3] present > 1 hour (Exception: Chronic unchanged pain.)    Additional Information   Negative: Sounds like a life-threatening emergency to the triager   New or worsening leg (calf, thigh) pain   Negative: Looks like a broken bone or dislocated joint (e.g., crooked or deformed)   Negative: Sounds like a life-threatening emergency to the triager   Negative: Chest pain   Negative: Difficulty breathing   Negative: Entire foot is cool or blue in comparison to other side   Negative: Unable to walk   Negative: [1] Red area or streak AND [2] fever   Negative: [1] Swollen joint AND [2] fever   Negative: [1] Cast on leg or ankle AND [2] now increased pain   Negative: Patient sounds very sick or weak to the triager   Negative: [1] SEVERE pain (e.g., excruciating, unable to do any normal activities) AND [2] not improved after 2 hours of pain medicine    Protocols used: Post-Op Symptoms and [id number], Leg Pain-A-AH

## 2023-11-18 NOTE — ED NOTES
Earl Baldwin presents to the Ed with pain to the left calf after surgery yesterday.  No other complaints at this time. Bed low and locked. Pt connected to BP cuff and pulse ox.  Call light within reach.

## 2023-11-18 NOTE — ED PROVIDER NOTES
Chief complaint:  Leg Pain (Left leg pain-  states felt it this morning at 1030 when he got up.   Want U/S to eval for clot. )      HPI:  Earl Baldwin is a 78 y.o. male POD 1 s/p indirect L inguinal hernia repair presenting with left calf pain noted upon getting out of bed this morning.  He noted pain in the lateral calf upon standing.  He denies pain otherwise.  He denies injury.  Did not notice this type of pain after surgery yesterday.  No new leg swelling.  No numbness or weakness.  No radiation or migration of pain.  No fall or injury.    ROS: As per HPI and below:  No joint swelling or redness.  No new abdominal pain or vomiting.  No fever.  No rash.    Review of patient's allergies indicates:   Allergen Reactions    Sulfa (sulfonamide antibiotics) Itching and Rash       Discharge Medication List as of 11/18/2023  4:46 PM        CONTINUE these medications which have NOT CHANGED    Details   ascorbic acid, vitamin C, (VITAMIN C) 1000 MG tablet Take 1,000 mg by mouth once daily., Historical Med      aspirin (ECOTRIN) 81 MG EC tablet Take 81 mg by mouth once daily., Historical Med      calcium carbonate/vitamin D3 (VITAMIN D-3 ORAL) Take 10,000 Units by mouth once daily., Historical Med      fish oil-omega-3 fatty acids 300-1,000 mg capsule Take 1,000 mg by mouth once daily., Historical Med      HYDROcodone-acetaminophen (NORCO) 7.5-325 mg per tablet Take 1 tablet by mouth every 4 (four) hours as needed for Pain., Starting Fri 11/17/2023, Normal      lysine 500 mg Tab Take 500 mg by mouth once daily., Historical Med      magnesium gluconate (MAGONATE) 27.5 mg (500 mg) tablet Take 500 mg by mouth once daily., Historical Med      ondansetron (ZOFRAN-ODT) 4 MG TbDL Take 1 tablet (4 mg total) by mouth every 6 (six) hours as needed (nv)., Starting Fri 11/17/2023, Normal      prasterone, dhea, (DHEA) 25 mg Tab Take 1 tablet by mouth once daily., Historical Med      rosuvastatin (CRESTOR) 10 MG tablet Take 10 mg by  mouth once daily., Historical Med      thyroid, pork, (ARMOUR THYROID) 60 mg Tab Take 60 mg by mouth before breakfast., Historical Med             PMH:  As per HPI and below:  Past Medical History:   Diagnosis Date    Cataract     Cerebral microvasculopathy 2019    Colon polyps     adeenomatous    Other specified spondylopathies, lumbar region 2022    Sleep apnea     uses cpap    Spinal stenosis, lumbar region with neurogenic claudication 2023    Thyroid disease     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     Past Surgical History:   Procedure Laterality Date    ADENOIDECTOMY      EYE SURGERY      hydrocele repair      KNEE ARTHROSCOPY W/ DEBRIDEMENT      left    MAGNETIC RESONANCE IMAGING N/A 2018    Procedure: MRI (Magnetic Resonance Imagine) needs anesthesia;  Surgeon: Teddy Hardwick MD;  Location: [facility];  Service: Anesthesiology;  Laterality: N/A;    MAGNETIC RESONANCE IMAGING N/A 2023    Procedure: MRI (Magnetic Resonance Imagine);  Surgeon: Teddy Hardwick MD;  Location: [facility];  Service: Anesthesiology;  Laterality: N/A;    SPINE SURGERY      TONSILLECTOMY         Social History     Socioeconomic History    Marital status:    Tobacco Use    Smoking status: Former     Current packs/day: 0.00     Types: Cigarettes     Quit date: 1979     Years since quittin.8    Smokeless tobacco: Former   Substance and Sexual Activity    Alcohol use: Yes     Alcohol/week: 7.0 standard drinks of alcohol     Types: 7 Glasses of wine per week     Comment: one glass of wine daily    Drug use: No    Sexual activity: Yes     Partners: Female   Social History Narrative    ** Merged History Encounter **          Social Determinants of Health     Financial Resource Strain: Low Risk  (3/6/2023)    Overall Financial Resource Strain (CARDIA)     Difficulty of Paying Living Expenses: Not hard at all   Food Insecurity: No Food Insecurity (3/6/2023)    Hunger  Vital Sign     Worried About Running Out of Food in the Last Year: Never true     Ran Out of Food in the Last Year: Never true   Transportation Needs: No Transportation Needs (3/6/2023)    PRAPARE - Transportation     Lack of Transportation (Medical): No     Lack of Transportation (Non-Medical): No   Physical Activity: Insufficiently Active (3/6/2023)    Exercise Vital Sign     Days of Exercise per Week: 3 days     Minutes of Exercise per Session: 20 min   Stress: No Stress Concern Present (3/6/2023)    [employer] - Occupational Stress Questionnaire     Feeling of Stress : Only a little   Social Connections: Moderately Isolated (3/6/2023)    Social Connection and Isolation Panel [NHANES]     Frequency of Communication with Friends and Family: Once a week     Frequency of Social Gatherings with Friends and Family: Never     Attends [employer] Services: More than 4 times per year     Active Member of Clubs or Organizations: No     Attends Club or Organization Meetings: Never     Marital Status:    Housing Stability: Low Risk  (3/6/2023)    Housing Stability Vital Sign     Unable to Pay for Housing in the Last Year: No     Number of Places Lived in the Last Year: 1     Unstable Housing in the Last Year: No       Family History   Problem Relation Age of Onset    No Known Problems Mother     Diabetes Father     Emphysema Father     Diabetes Sister        Physical Exam:    Vitals:    11/18/23 1632   BP: 130/66   Pulse: 64   Resp:    Temp:      GENERAL:  No apparent distress.  Alert.    HEENT:  Moist mucous membranes.  Normocephalic and atraumatic.    NECK:  No swelling.  Midline trachea.   CARDIOVASCULAR:  Regular rate and rhythm.  2+ radial pulses.    PULMONARY:  Lungs clear to auscultation bilaterally.  No wheezes, rales, or rhonci.    ABDOMEN:  Non-tender and non-distended.  Port site incisions without erythema, dehiscence, drainage.  EXTREMITIES:  Warm and well perfused.  Brisk  capillary refill.  Minimal left lateral calf tenderness to palpation.  No joint effusion or erythema.  Full active range of motion of the left ankle and knee without pain.  2+ DP and PT pulses.  No leg asymmetry.  No posterior thigh tenderness to palpation.  NEUROLOGICAL:  Normal mental status.  Appropriate and conversant.  5/5 strength and equal sensation to light touch in the distal lower extremities.  SKIN:  No rashes or ecchymoses.      Labs Reviewed - No data to display    Discharge Medication List as of 11/18/2023  4:46 PM        CONTINUE these medications which have NOT CHANGED    Details   ascorbic acid, vitamin C, (VITAMIN C) 1000 MG tablet Take 1,000 mg by mouth once daily., Historical Med      aspirin (ECOTRIN) 81 MG EC tablet Take 81 mg by mouth once daily., Historical Med      calcium carbonate/vitamin D3 (VITAMIN D-3 ORAL) Take 10,000 Units by mouth once daily., Historical Med      fish oil-omega-3 fatty acids 300-1,000 mg capsule Take 1,000 mg by mouth once daily., Historical Med      HYDROcodone-acetaminophen (NORCO) 7.5-325 mg per tablet Take 1 tablet by mouth every 4 (four) hours as needed for Pain., Starting Fri 11/17/2023, Normal      lysine 500 mg Tab Take 500 mg by mouth once daily., Historical Med      magnesium gluconate (MAGONATE) 27.5 mg (500 mg) tablet Take 500 mg by mouth once daily., Historical Med      ondansetron (ZOFRAN-ODT) 4 MG TbDL Take 1 tablet (4 mg total) by mouth every 6 (six) hours as needed (nv)., Starting Fri 11/17/2023, Normal      prasterone, dhea, (DHEA) 25 mg Tab Take 1 tablet by mouth once daily., Historical Med      rosuvastatin (CRESTOR) 10 MG tablet Take 10 mg by mouth once daily., Historical Med      thyroid, pork, (ARMOUR THYROID) 60 mg Tab Take 60 mg by mouth before breakfast., Historical Med             Orders Placed This Encounter   Procedures    US Lower Extremity Veins Left       Imaging Results              US Lower Extremity Veins Left (Final result)  Result  time 11/18/23 16:42:27      Final result by Jere Ramos MD (11/18/23 16:42:27)                   Impression:      No evidence of deep venous thrombosis in the left lower extremity.      Electronically signed by: Jere Ramos  Date:    11/18/2023  Time:    16:42               Narrative:    EXAMINATION:  US LOWER EXTREMITY VEINS LEFT    CLINICAL HISTORY:  Pain in left lower leg    TECHNIQUE:  Duplex and color flow Doppler evaluation and graded compression of the left lower extremity veins was performed.    COMPARISON:  None    FINDINGS:  Left thigh veins: The common femoral, femoral, popliteal, upper greater saphenous, and deep femoral veins are patent and free of thrombus. The veins are normally compressible and have normal phasic flow and augmentation response.    Left calf veins: The visualized calf veins are patent.    Contralateral CFV: The contralateral (right) common femoral vein is patent and free of thrombus.    Miscellaneous: None                                  (Rad read)         MDM:    78 y.o. male with left calf pain postoperative day 1 from hernia repair.  Excellent distal pulses.  I doubt acute arterial occlusive disease.  There is no leg asymmetry with only focal lateral calf tenderness.  I suspect muscular etiology.  Ultrasound ordered at patient request to exclude DVT with low suspicion.  No symptoms to suggest PE.  He is ambulating without difficulty without history of trauma.  I do not think other imaging is indicated.  I doubt fracture or dislocation.  Ultrasound is negative for DVT.  He is appropriate for outpatient follow-up.  Return precautions reviewed.    Diagnoses:    1. Left calf pain       Filippo Solares MD  11/18/23 1749

## 2023-11-20 ENCOUNTER — PATIENT MESSAGE (OUTPATIENT)
Dept: ADMINISTRATIVE | Facility: HOSPITAL | Age: 78
End: 2023-11-20
Payer: MEDICARE

## 2023-11-20 ENCOUNTER — PATIENT OUTREACH (OUTPATIENT)
Dept: ADMINISTRATIVE | Facility: HOSPITAL | Age: 78
End: 2023-11-20
Payer: MEDICARE

## 2023-11-27 ENCOUNTER — LAB VISIT (OUTPATIENT)
Dept: LAB | Facility: HOSPITAL | Age: 78
End: 2023-11-27
Attending: FAMILY MEDICINE
Payer: MEDICARE

## 2023-11-27 DIAGNOSIS — E03.9 ACQUIRED HYPOTHYROIDISM: ICD-10-CM

## 2023-11-27 DIAGNOSIS — R55 POSTURAL DIZZINESS WITH PRESYNCOPE: ICD-10-CM

## 2023-11-27 DIAGNOSIS — E78.00 HYPERCHOLESTEREMIA: ICD-10-CM

## 2023-11-27 DIAGNOSIS — R42 POSTURAL DIZZINESS WITH PRESYNCOPE: ICD-10-CM

## 2023-11-27 LAB
ALBUMIN SERPL BCP-MCNC: 4.1 G/DL (ref 3.5–5.2)
ALBUMIN SERPL BCP-MCNC: 4.1 G/DL (ref 3.5–5.2)
ALP SERPL-CCNC: 47 U/L (ref 55–135)
ALP SERPL-CCNC: 47 U/L (ref 55–135)
ALT SERPL W/O P-5'-P-CCNC: 15 U/L (ref 10–44)
ALT SERPL W/O P-5'-P-CCNC: 15 U/L (ref 10–44)
ANION GAP SERPL CALC-SCNC: 5 MMOL/L (ref 8–16)
ANION GAP SERPL CALC-SCNC: 5 MMOL/L (ref 8–16)
AST SERPL-CCNC: 17 U/L (ref 10–40)
AST SERPL-CCNC: 17 U/L (ref 10–40)
BASOPHILS # BLD AUTO: 0.03 K/UL (ref 0–0.2)
BASOPHILS NFR BLD: 0.6 % (ref 0–1.9)
BILIRUB SERPL-MCNC: 0.7 MG/DL (ref 0.1–1)
BILIRUB SERPL-MCNC: 0.7 MG/DL (ref 0.1–1)
BUN SERPL-MCNC: 16 MG/DL (ref 8–23)
BUN SERPL-MCNC: 16 MG/DL (ref 8–23)
CALCIUM SERPL-MCNC: 9.2 MG/DL (ref 8.7–10.5)
CALCIUM SERPL-MCNC: 9.2 MG/DL (ref 8.7–10.5)
CHLORIDE SERPL-SCNC: 104 MMOL/L (ref 95–110)
CHLORIDE SERPL-SCNC: 104 MMOL/L (ref 95–110)
CHOLEST SERPL-MCNC: 148 MG/DL (ref 120–199)
CHOLEST/HDLC SERPL: 2.6 {RATIO} (ref 2–5)
CO2 SERPL-SCNC: 31 MMOL/L (ref 23–29)
CO2 SERPL-SCNC: 31 MMOL/L (ref 23–29)
CREAT SERPL-MCNC: 1.2 MG/DL (ref 0.5–1.4)
CREAT SERPL-MCNC: 1.2 MG/DL (ref 0.5–1.4)
DIFFERENTIAL METHOD BLD: ABNORMAL
EOSINOPHIL # BLD AUTO: 0.2 K/UL (ref 0–0.5)
EOSINOPHIL NFR BLD: 3.1 % (ref 0–8)
ERYTHROCYTE [DISTWIDTH] IN BLOOD BY AUTOMATED COUNT: 12.3 % (ref 11.5–14.5)
EST. GFR  (NO RACE VARIABLE): >60 ML/MIN/1.73 M^2
EST. GFR  (NO RACE VARIABLE): >60 ML/MIN/1.73 M^2
GLUCOSE SERPL-MCNC: 103 MG/DL (ref 70–110)
GLUCOSE SERPL-MCNC: 103 MG/DL (ref 70–110)
HCT VFR BLD AUTO: 41.7 % (ref 40–54)
HDLC SERPL-MCNC: 57 MG/DL (ref 40–75)
HDLC SERPL: 38.5 % (ref 20–50)
HGB BLD-MCNC: 14.4 G/DL (ref 14–18)
IMM GRANULOCYTES # BLD AUTO: 0.02 K/UL (ref 0–0.04)
IMM GRANULOCYTES NFR BLD AUTO: 0.4 % (ref 0–0.5)
LDLC SERPL CALC-MCNC: 75 MG/DL (ref 63–159)
LYMPHOCYTES # BLD AUTO: 1.8 K/UL (ref 1–4.8)
LYMPHOCYTES NFR BLD: 33.7 % (ref 18–48)
MAGNESIUM SERPL-MCNC: 2.4 MG/DL (ref 1.6–2.6)
MCH RBC QN AUTO: 32.3 PG (ref 27–31)
MCHC RBC AUTO-ENTMCNC: 34.5 G/DL (ref 32–36)
MCV RBC AUTO: 94 FL (ref 82–98)
MONOCYTES # BLD AUTO: 0.7 K/UL (ref 0.3–1)
MONOCYTES NFR BLD: 12.6 % (ref 4–15)
NEUTROPHILS # BLD AUTO: 2.6 K/UL (ref 1.8–7.7)
NEUTROPHILS NFR BLD: 49.6 % (ref 38–73)
NONHDLC SERPL-MCNC: 91 MG/DL
NRBC BLD-RTO: 0 /100 WBC
PLATELET # BLD AUTO: 135 K/UL (ref 150–450)
PMV BLD AUTO: 11.8 FL (ref 9.2–12.9)
POTASSIUM SERPL-SCNC: 4.1 MMOL/L (ref 3.5–5.1)
POTASSIUM SERPL-SCNC: 4.1 MMOL/L (ref 3.5–5.1)
PROT SERPL-MCNC: 6.8 G/DL (ref 6–8.4)
PROT SERPL-MCNC: 6.8 G/DL (ref 6–8.4)
RBC # BLD AUTO: 4.46 M/UL (ref 4.6–6.2)
SODIUM SERPL-SCNC: 140 MMOL/L (ref 136–145)
SODIUM SERPL-SCNC: 140 MMOL/L (ref 136–145)
TRIGL SERPL-MCNC: 80 MG/DL (ref 30–150)
TSH SERPL DL<=0.005 MIU/L-ACNC: 0.71 UIU/ML (ref 0.34–5.6)
WBC # BLD AUTO: 5.23 K/UL (ref 3.9–12.7)

## 2023-11-27 PROCEDURE — 85025 COMPLETE CBC W/AUTO DIFF WBC: CPT | Performed by: FAMILY MEDICINE

## 2023-11-27 PROCEDURE — 80061 LIPID PANEL: CPT | Performed by: FAMILY MEDICINE

## 2023-11-27 PROCEDURE — 84436 ASSAY OF TOTAL THYROXINE: CPT | Performed by: FAMILY MEDICINE

## 2023-11-27 PROCEDURE — 84480 ASSAY TRIIODOTHYRONINE (T3): CPT | Performed by: FAMILY MEDICINE

## 2023-11-27 PROCEDURE — 80053 COMPREHEN METABOLIC PANEL: CPT | Performed by: FAMILY MEDICINE

## 2023-11-27 PROCEDURE — 36415 COLL VENOUS BLD VENIPUNCTURE: CPT | Performed by: FAMILY MEDICINE

## 2023-11-27 PROCEDURE — 84443 ASSAY THYROID STIM HORMONE: CPT | Performed by: FAMILY MEDICINE

## 2023-11-27 PROCEDURE — 83735 ASSAY OF MAGNESIUM: CPT | Performed by: FAMILY MEDICINE

## 2023-11-28 ENCOUNTER — TELEPHONE (OUTPATIENT)
Dept: FAMILY MEDICINE | Facility: CLINIC | Age: 78
End: 2023-11-28

## 2023-11-28 LAB
T3 SERPL-MCNC: 173 NG/DL (ref 71–180)
T4 SERPL-MCNC: 8.5 UG/DL (ref 4.5–12)

## 2023-11-28 NOTE — TELEPHONE ENCOUNTER
----- Message from Payam Noonan MD sent at 11/27/2023 11:23 AM CST -----  Results Ok, notify patient.

## 2023-11-28 NOTE — TELEPHONE ENCOUNTER
Discussed results and recommendations with pt daughter Christine. Ms King stated she will inform her dad and look at the portal.

## 2023-11-29 ENCOUNTER — OFFICE VISIT (OUTPATIENT)
Dept: SURGERY | Facility: CLINIC | Age: 78
End: 2023-11-29
Payer: MEDICARE

## 2023-11-29 VITALS
RESPIRATION RATE: 16 BRPM | TEMPERATURE: 98 F | WEIGHT: 214.5 LBS | HEIGHT: 70 IN | DIASTOLIC BLOOD PRESSURE: 81 MMHG | HEART RATE: 63 BPM | SYSTOLIC BLOOD PRESSURE: 124 MMHG | BODY MASS INDEX: 30.71 KG/M2

## 2023-11-29 DIAGNOSIS — K40.90 NON-RECURRENT UNILATERAL INGUINAL HERNIA WITHOUT OBSTRUCTION OR GANGRENE: Primary | ICD-10-CM

## 2023-11-29 PROCEDURE — 99999 PR PBB SHADOW E&M-EST. PATIENT-LVL III: ICD-10-PCS | Mod: PBBFAC,,, | Performed by: SURGERY

## 2023-11-29 PROCEDURE — 99024 POSTOP FOLLOW-UP VISIT: CPT | Mod: POP,,, | Performed by: SURGERY

## 2023-11-29 PROCEDURE — 99213 OFFICE O/P EST LOW 20 MIN: CPT | Mod: PBBFAC,PN | Performed by: SURGERY

## 2023-11-29 PROCEDURE — 99999 PR PBB SHADOW E&M-EST. PATIENT-LVL III: CPT | Mod: PBBFAC,,, | Performed by: SURGERY

## 2023-11-29 PROCEDURE — 99024 PR POST-OP FOLLOW-UP VISIT: ICD-10-PCS | Mod: POP,,, | Performed by: SURGERY

## 2023-11-29 NOTE — PROGRESS NOTES
Doing well  No pain or swelling  Vitals:    11/29/23 0947   BP: 124/81   Pulse: 63   Resp: 16   Temp: 97.7 °F (36.5 °C)     Abdomen with well healed incisions  Left inguinal region without hernia pain swelling or bruising    A/P  Sp robotic lih repair with mesh  Doing very well  Planning to have electrical stimulation of his leg and physical therapy of his knee.  I would like him to wait 1 week and then okay to proceed so long as he would not have any pain during the therapy time.

## 2023-12-06 ENCOUNTER — TELEPHONE (OUTPATIENT)
Dept: FAMILY MEDICINE | Facility: CLINIC | Age: 78
End: 2023-12-06

## 2023-12-06 NOTE — TELEPHONE ENCOUNTER
----- Message from Alissa Brown sent at 12/6/2023  3:00 PM CST -----  Type:  Needs Medical Advice    Who Called:  Pt    Would the patient rather a call back or a response via [facility]?  Call back    Best Call Back Number:  [phone]    Additional Information:  Pt is asking to speak with nurse.  Please call back to advise. Thanks!

## 2023-12-27 ENCOUNTER — TELEPHONE (OUTPATIENT)
Dept: BARIATRICS | Facility: CLINIC | Age: 78
End: 2023-12-27
Payer: MEDICARE

## 2023-12-27 NOTE — TELEPHONE ENCOUNTER
Returned call to pt. Offered first available appt on 24. He reports he may have a schedule conflict and will have to check his calendar when he gets home. Pt will contact me if needs to change appt, but booked the one offered for now.     ----- Message from Sandie Anders sent at 2023 11:41 AM CST -----  Regarding: Needs to rescheudle post op appt  Type: Needs Medical Advice  Who Called:  Earl    [place] Call Back Number: [phone]    Additional Information: Pt had to cancel on 1/3 due to a  he has to attend please call to rescheudule

## 2023-12-28 ENCOUNTER — PATIENT MESSAGE (OUTPATIENT)
Dept: SURGERY | Facility: CLINIC | Age: 78
End: 2023-12-28
Payer: MEDICARE

## 2024-01-05 ENCOUNTER — PATIENT MESSAGE (OUTPATIENT)
Dept: FAMILY MEDICINE | Facility: CLINIC | Age: 79
End: 2024-01-05
Payer: MEDICARE

## 2024-01-05 DIAGNOSIS — E78.2 MIXED HYPERLIPIDEMIA: ICD-10-CM

## 2024-01-05 DIAGNOSIS — E03.9 ACQUIRED HYPOTHYROIDISM: Primary | ICD-10-CM

## 2024-01-05 RX ORDER — THYROID 60 MG/1
60 TABLET ORAL
Qty: 90 TABLET | Refills: 3 | Status: SHIPPED | OUTPATIENT
Start: 2024-01-05 | End: 2025-01-04

## 2024-01-05 RX ORDER — ROSUVASTATIN CALCIUM 10 MG/1
10 TABLET, COATED ORAL DAILY
Qty: 90 TABLET | Refills: 1 | Status: SHIPPED | OUTPATIENT
Start: 2024-01-05 | End: 2025-01-04

## 2024-01-05 NOTE — TELEPHONE ENCOUNTER
Patient message      Dr. Noonan recently ordered blood work for me to check my thyroid and colesterol. After checking my recent blood work, could you please check with Dr. Noonna to see if he agrees with my current dosages of [place] (60MG) and Rosuvastatin (10MG). If Changes need to be made, I am now using [facility] at [address] [facility] Telephone [phone]. Even though [place] is not covered by insurance, I would like to continue using [place].

## 2024-01-09 ENCOUNTER — OFFICE VISIT (OUTPATIENT)
Dept: SURGERY | Facility: CLINIC | Age: 79
End: 2024-01-09
Payer: MEDICARE

## 2024-01-09 VITALS
DIASTOLIC BLOOD PRESSURE: 71 MMHG | RESPIRATION RATE: 16 BRPM | TEMPERATURE: 98 F | HEART RATE: 72 BPM | WEIGHT: 220.38 LBS | BODY MASS INDEX: 31.55 KG/M2 | SYSTOLIC BLOOD PRESSURE: 124 MMHG | HEIGHT: 70 IN

## 2024-01-09 DIAGNOSIS — K40.90 NON-RECURRENT UNILATERAL INGUINAL HERNIA WITHOUT OBSTRUCTION OR GANGRENE: Primary | ICD-10-CM

## 2024-01-09 PROCEDURE — 99999 PR PBB SHADOW E&M-EST. PATIENT-LVL III: CPT | Mod: PBBFAC,,, | Performed by: SURGERY

## 2024-01-09 PROCEDURE — 99024 POSTOP FOLLOW-UP VISIT: CPT | Mod: S$PBB,,, | Performed by: SURGERY

## 2024-01-09 PROCEDURE — 99213 OFFICE O/P EST LOW 20 MIN: CPT | Mod: PBBFAC,PN | Performed by: SURGERY

## 2024-01-09 NOTE — PROGRESS NOTES
Doing well  Vitals:    01/09/24 0932   BP: 124/71   Pulse: 72   Resp: 16   Temp: 97.7 °F (36.5 °C)     Incisions healed  No hernia recurrence    A/P  Sp LIH- robotic   Ok for full activity

## 2024-03-26 DIAGNOSIS — Z00.00 ENCOUNTER FOR MEDICARE ANNUAL WELLNESS EXAM: ICD-10-CM

## 2024-04-26 ENCOUNTER — OFFICE VISIT (OUTPATIENT)
Dept: FAMILY MEDICINE | Facility: CLINIC | Age: 79
End: 2024-04-26
Payer: MEDICARE

## 2024-04-26 VITALS
DIASTOLIC BLOOD PRESSURE: 68 MMHG | RESPIRATION RATE: 18 BRPM | BODY MASS INDEX: 31.92 KG/M2 | HEART RATE: 66 BPM | WEIGHT: 223 LBS | HEIGHT: 70 IN | OXYGEN SATURATION: 96 % | SYSTOLIC BLOOD PRESSURE: 124 MMHG | TEMPERATURE: 98 F

## 2024-04-26 DIAGNOSIS — K22.70 BARRETT'S ESOPHAGUS WITHOUT DYSPLASIA: ICD-10-CM

## 2024-04-26 DIAGNOSIS — E03.9 ACQUIRED HYPOTHYROIDISM: Primary | ICD-10-CM

## 2024-04-26 DIAGNOSIS — E78.5 HYPERLIPIDEMIA, UNSPECIFIED HYPERLIPIDEMIA TYPE: ICD-10-CM

## 2024-04-26 DIAGNOSIS — R29.818 TRANSIENT NEUROLOGICAL SYMPTOMS: ICD-10-CM

## 2024-04-26 DIAGNOSIS — I35.1 NONRHEUMATIC AORTIC VALVE INSUFFICIENCY: ICD-10-CM

## 2024-04-26 PROCEDURE — 99999 PR PBB SHADOW E&M-EST. PATIENT-LVL III: CPT | Mod: PBBFAC,,, | Performed by: FAMILY MEDICINE

## 2024-04-26 PROCEDURE — 99213 OFFICE O/P EST LOW 20 MIN: CPT | Mod: PBBFAC,PN | Performed by: FAMILY MEDICINE

## 2024-04-26 PROCEDURE — 99213 OFFICE O/P EST LOW 20 MIN: CPT | Mod: S$PBB,AQ,, | Performed by: FAMILY MEDICINE

## 2024-04-26 NOTE — PROGRESS NOTES
Subjective:       Patient ID: Earl Baldwin is a 79 y.o. male.    Chief Complaint: Memory Loss      Patient comes in today because of memory loss.  He has a strong family history of dementia.  Short-term memory loss.  Lab Results       Component                Value               Date                       WBC                      5.23                11/27/2023                 HGB                      14.4                11/27/2023                 HCT                      41.7                11/27/2023                 PLT                      135 (L)             11/27/2023                 CHOL                     148                 11/27/2023                 TRIG                     80                  11/27/2023                 HDL                      57                  11/27/2023                 ALT                      15                  11/27/2023                 ALT                      15                  11/27/2023                 AST                      17                  11/27/2023                 AST                      17                  11/27/2023                 NA                       140                 11/27/2023                 NA                       140                 11/27/2023                 K                        4.1                 11/27/2023                 K                        4.1                 11/27/2023                 CL                       104                 11/27/2023                 CL                       104                 11/27/2023                 CREATININE               1.2                 11/27/2023                 CREATININE               1.2                 11/27/2023                 BUN                      16                  11/27/2023                 BUN                      16                  11/27/2023                 CO2                      31 (H)              11/27/2023                 CO2                      31 (H)               11/27/2023                 TSH                      0.711               11/27/2023                 PSA                      1.0                 01/14/2010                 HGBA1C                   5.4                 05/14/2019            He has had a dementia workup through Dr. Aquino and was diagnosed as having a genetic defect.   Was prescribed multiple supplements-was placed on biotin and a number of other things which he will send me a list of. Feels improvement.        Allergies and Medications:   Review of patient's allergies indicates:   Allergen Reactions    Sulfa (sulfonamide antibiotics) Itching and Rash     Current Outpatient Medications   Medication Sig Dispense Refill    ascorbic acid, vitamin C, (VITAMIN C) 1000 MG tablet Take 1,000 mg by mouth once daily.      aspirin (ECOTRIN) 81 MG EC tablet Take 81 mg by mouth once daily.      fish oil-omega-3 fatty acids 300-1,000 mg capsule Take 1,000 mg by mouth once daily.      lysine 500 mg Tab Take 500 mg by mouth once daily.      magnesium gluconate (MAGONATE) 27.5 mg (500 mg) tablet Take 500 mg by mouth once daily.      prasterone, dhea, (DHEA) 25 mg Tab Take 1 tablet by mouth once daily.      rosuvastatin (CRESTOR) 10 MG tablet Take 1 tablet (10 mg total) by mouth once daily. 90 tablet 1    thyroid, pork, (ARMOUR THYROID) 60 mg Tab Take 1 tablet (60 mg total) by mouth before breakfast. 90 tablet 3    calcium carbonate/vitamin D3 (VITAMIN D-3 ORAL) Take 10,000 Units by mouth once daily. (Patient not taking: Reported on 4/26/2024)      ondansetron (ZOFRAN-ODT) 4 MG TbDL Take 1 tablet (4 mg total) by mouth every 6 (six) hours as needed (nv). (Patient not taking: Reported on 4/26/2024) 30 tablet 0     No current facility-administered medications for this visit.       Family History:   Family History   Problem Relation Name Age of Onset    No Known Problems Mother      Diabetes Father      Emphysema Father      Diabetes Sister         Social History:   Social  History     Socioeconomic History    Marital status:    Tobacco Use    Smoking status: Former     Current packs/day: 0.00     Types: Cigarettes     Quit date: 1979     Years since quittin.2    Smokeless tobacco: Former   Substance and Sexual Activity    Alcohol use: Yes     Alcohol/week: 7.0 standard drinks of alcohol     Types: 7 Glasses of wine per week     Comment: one glass of wine daily    Drug use: No    Sexual activity: Yes     Partners: Female   Social History Narrative    ** Merged History Encounter **          Social Determinants of Health     Financial Resource Strain: Low Risk  (3/6/2023)    Overall Financial Resource Strain (CARDIA)     Difficulty of Paying Living Expenses: Not hard at all   Food Insecurity: No Food Insecurity (3/6/2023)    Hunger Vital Sign     Worried About Running Out of Food in the Last Year: Never true     Ran Out of Food in the Last Year: Never true   Transportation Needs: No Transportation Needs (3/6/2023)    PRAPARE - Transportation     Lack of Transportation (Medical): No     Lack of Transportation (Non-Medical): No   Physical Activity: Insufficiently Active (3/6/2023)    Exercise Vital Sign     Days of Exercise per Week: 3 days     Minutes of Exercise per Session: 20 min   Stress: No Stress Concern Present (3/6/2023)    [employer] - Occupational Stress Questionnaire     Feeling of Stress : Only a little   Social Connections: Moderately Isolated (3/6/2023)    Social Connection and Isolation Panel [NHANES]     Frequency of Communication with Friends and Family: Once a week     Frequency of Social Gatherings with Friends and Family: Never     Attends Sabianism Services: More than 4 times per year     Active Member of Clubs or Organizations: No     Attends Club or Organization Meetings: Never     Marital Status:    Housing Stability: Low Risk  (3/6/2023)    Housing Stability Vital Sign     Unable to Pay for Housing in the Last  Year: No     Number of Places Lived in the Last Year: 1     Unstable Housing in the Last Year: No       Review of Systems    Objective:     Vitals:    04/26/24 1325   BP: 124/68   Pulse: 66   Resp: 18   Temp: 98.4 °F (36.9 °C)        Physical Exam    Assessment:       1. Acquired hypothyroidism    2. Luis's esophagus without dysplasia    3. Hyperlipidemia, unspecified hyperlipidemia type    4. Transient neurological symptoms    5. Nonrheumatic aortic valve insufficiency        Plan:       Earl was seen today for memory loss.    Diagnoses and all orders for this visit:    Acquired hypothyroidism    Luis's esophagus without dysplasia    Hyperlipidemia, unspecified hyperlipidemia type  -     Lipid Panel; Future  -     Comprehensive Metabolic Panel; Future    Transient neurological symptoms    Nonrheumatic aortic valve insufficiency         Follow up in about 6 months (around 10/26/2024) for follow up HTN, follow up cholesterol, HRA with [facility] or [facility].

## 2024-04-29 ENCOUNTER — LAB VISIT (OUTPATIENT)
Dept: LAB | Facility: HOSPITAL | Age: 79
End: 2024-04-29
Attending: FAMILY MEDICINE
Payer: MEDICARE

## 2024-04-29 DIAGNOSIS — R55 POSTURAL DIZZINESS WITH PRESYNCOPE: ICD-10-CM

## 2024-04-29 DIAGNOSIS — R42 POSTURAL DIZZINESS WITH PRESYNCOPE: ICD-10-CM

## 2024-04-29 DIAGNOSIS — E78.00 HYPERCHOLESTEREMIA: ICD-10-CM

## 2024-04-29 DIAGNOSIS — E78.5 HYPERLIPIDEMIA, UNSPECIFIED HYPERLIPIDEMIA TYPE: ICD-10-CM

## 2024-04-29 LAB
ALBUMIN SERPL BCP-MCNC: 3.7 G/DL (ref 3.5–5.2)
ALP SERPL-CCNC: 49 U/L (ref 55–135)
ALT SERPL W/O P-5'-P-CCNC: 13 U/L (ref 10–44)
ANION GAP SERPL CALC-SCNC: 7 MMOL/L (ref 8–16)
AST SERPL-CCNC: 18 U/L (ref 10–40)
BILIRUB SERPL-MCNC: 0.8 MG/DL (ref 0.1–1)
BUN SERPL-MCNC: 17 MG/DL (ref 8–23)
CALCIUM SERPL-MCNC: 9.1 MG/DL (ref 8.7–10.5)
CHLORIDE SERPL-SCNC: 106 MMOL/L (ref 95–110)
CHOLEST SERPL-MCNC: 180 MG/DL (ref 120–199)
CHOLEST SERPL-MCNC: 180 MG/DL (ref 120–199)
CHOLEST/HDLC SERPL: 3.1 {RATIO} (ref 2–5)
CHOLEST/HDLC SERPL: 3.1 {RATIO} (ref 2–5)
CO2 SERPL-SCNC: 27 MMOL/L (ref 23–29)
CREAT SERPL-MCNC: 1.3 MG/DL (ref 0.5–1.4)
EST. GFR  (NO RACE VARIABLE): 56.6 ML/MIN/1.73 M^2
GLUCOSE SERPL-MCNC: 97 MG/DL (ref 70–110)
HDLC SERPL-MCNC: 59 MG/DL (ref 40–75)
HDLC SERPL-MCNC: 59 MG/DL (ref 40–75)
HDLC SERPL: 32.8 % (ref 20–50)
HDLC SERPL: 32.8 % (ref 20–50)
LDLC SERPL CALC-MCNC: 105.8 MG/DL (ref 63–159)
LDLC SERPL CALC-MCNC: 105.8 MG/DL (ref 63–159)
NONHDLC SERPL-MCNC: 121 MG/DL
NONHDLC SERPL-MCNC: 121 MG/DL
POTASSIUM SERPL-SCNC: 3.9 MMOL/L (ref 3.5–5.1)
PROT SERPL-MCNC: 6.5 G/DL (ref 6–8.4)
SODIUM SERPL-SCNC: 140 MMOL/L (ref 136–145)
TRIGL SERPL-MCNC: 76 MG/DL (ref 30–150)
TRIGL SERPL-MCNC: 76 MG/DL (ref 30–150)

## 2024-04-29 PROCEDURE — 36415 COLL VENOUS BLD VENIPUNCTURE: CPT | Performed by: FAMILY MEDICINE

## 2024-04-29 PROCEDURE — 80053 COMPREHEN METABOLIC PANEL: CPT | Performed by: FAMILY MEDICINE

## 2024-04-29 PROCEDURE — 80061 LIPID PANEL: CPT | Performed by: FAMILY MEDICINE

## 2024-07-10 DIAGNOSIS — E78.2 MIXED HYPERLIPIDEMIA: ICD-10-CM

## 2024-07-10 RX ORDER — ROSUVASTATIN CALCIUM 10 MG/1
10 TABLET, COATED ORAL DAILY
Qty: 90 TABLET | Refills: 1 | Status: SHIPPED | OUTPATIENT
Start: 2024-07-10 | End: 2025-07-10

## 2024-07-12 ENCOUNTER — TELEPHONE (OUTPATIENT)
Dept: DERMATOLOGY | Facility: CLINIC | Age: 79
End: 2024-07-12
Payer: MEDICARE

## 2024-07-12 NOTE — TELEPHONE ENCOUNTER
----- Message from Luke Fitzgerald sent at 7/12/2024  2:46 PM CDT -----  Type: Needs Medical Advice    Who Called:  Pt    Best Call Back Number: [phone]    Additional Information: Pt states that he was speaking with office about anastacio for his wife and him but got disconnected. Please call back to advise, Thanks!

## 2024-08-12 ENCOUNTER — NURSE TRIAGE (OUTPATIENT)
Dept: ADMINISTRATIVE | Facility: CLINIC | Age: 79
End: 2024-08-12
Payer: MEDICARE

## 2024-08-12 NOTE — TELEPHONE ENCOUNTER
Spoke with pt who reports that he has been noting fluttering with his heart. While on phone with pt states he feels ok. States that he had episode of dizziness yesterday day.States that [facility], and was told he should be seen by an endocrinologist but appointment is is to far off, requesting to have appointment with Dr. Noonan. Pt advised to be seen by provider within 4 hours. ED/UC advised. Pt verbalized understanding. Pt is still requesting to have appointment with DR. Noonan as soon as possible. Will send message to office    Reason for Disposition   Age > 60 years  (Exception: Brief heartbeat symptoms that went away and now feels well.)    Additional Information   Negative: Passed out (e.g., fainted, lost consciousness, blacked out and was not responding)   Negative: Shock suspected (e.g., cold/pale/clammy skin, too weak to stand, low BP, rapid pulse)   Negative: Difficult to awaken or acting confused (e.g., disoriented, slurred speech)   Negative: Visible sweat on face or sweat dripping down face   Negative: Unable to walk, or can only walk with assistance (e.g., requires support)   Negative: [1] Received SHOCK from implantable cardiac defibrillator AND [2] persisting symptoms (i.e., palpitations, lightheadedness)   Negative: [1] Dizziness, lightheadedness, or weakness AND [2] heart beating very rapidly (e.g., > 140 / minute)   Negative: [1] Feeling weak or lightheaded (e.g., woozy, feeling like they might faint) AND [2] heart beating very slowly (e.g., < 50 / minute)   Negative: Sounds like a life-threatening emergency to the triager   Negative: Difficulty breathing   Negative: Feeling weak or lightheaded (e.g., woozy, feeling like they might faint)   Negative: [1] Heart beating very rapidly (e.g., > 140 / minute) AND [2] present now  (Exception: During exercise.)   Negative: Heart beating very slowly (e.g., < 50 / minute)  (Exception: Athlete and heart rate normal for caller.)   Negative: New  or worsened shortness of breath with activity (dyspnea on exertion)   Negative: Patient sounds very sick or weak to the triager   Negative: [1] Heart beating very rapidly (e.g., > 140 / minute) AND [2] not present now  (Exception: During exercise.)   Negative: [1] Skipped or extra beat(s) AND [2] increases with exercise or exertion   Negative: [1] Skipped or extra beat(s) AND [2] occurs 4 or more times per minute   Negative: New or worsened ankle swelling   Negative: History of heart disease (i.e., heart attack, bypass surgery, angina, angioplasty, CHF)  (Exception: Brief heartbeat symptoms that went away and now feels well.)    Protocols used: Heart Rate and Heartbeat Carurdefq-B-UR

## 2024-08-13 ENCOUNTER — TELEPHONE (OUTPATIENT)
Dept: FAMILY MEDICINE | Facility: CLINIC | Age: 79
End: 2024-08-13
Payer: MEDICARE

## 2024-08-14 NOTE — TELEPHONE ENCOUNTER
----- Message from Jeannette Long sent at 8/13/2024  2:09 PM CDT -----  Regarding: Same Day Appointment Request  Contact: patient at [phone]  Type:  Same Day Appointment Request    Name of Caller:  patient at [phone]    Symptoms:  flutter in heart  Additional Information:  Went to urgent care per triage nurse. Please call and advise. Thank you

## 2024-08-27 ENCOUNTER — OFFICE VISIT (OUTPATIENT)
Dept: DERMATOLOGY | Facility: CLINIC | Age: 79
End: 2024-08-27
Payer: MEDICARE

## 2024-08-27 VITALS — WEIGHT: 220 LBS | HEIGHT: 70 IN | BODY MASS INDEX: 31.5 KG/M2

## 2024-08-27 DIAGNOSIS — D36.17 NEUROFIBROMA OF BACK: ICD-10-CM

## 2024-08-27 DIAGNOSIS — B07.8 COMMON WART: Primary | ICD-10-CM

## 2024-08-27 DIAGNOSIS — L82.1 SEBORRHEIC KERATOSES: ICD-10-CM

## 2024-08-27 DIAGNOSIS — Z12.83 SKIN CANCER SCREENING: ICD-10-CM

## 2024-08-27 DIAGNOSIS — L81.4 SOLAR LENTIGO: ICD-10-CM

## 2024-08-27 PROCEDURE — 17110 DESTRUCTION B9 LES UP TO 14: CPT | Mod: AQ,S$GLB,, | Performed by: DERMATOLOGY

## 2024-08-27 PROCEDURE — 99213 OFFICE O/P EST LOW 20 MIN: CPT | Mod: 25,AQ,S$GLB, | Performed by: DERMATOLOGY

## 2024-08-27 NOTE — PROGRESS NOTES
Subjective:      Patient ID:  Earl Baldwin is a 79 y.o. male who presents for   Chief Complaint   Patient presents with    Skin Check     UBSC      LOV 10/6/23 () NUB    1. Skin, left upper back, shave biopsy:   -SEBORRHEIC KERATOSIS, PIGMENTED     2. Skin, right of midline lower back, shave biopsy:   -NEUROFIBROMA     This lesion is benign.     Patient here today for UBSC   C/O spot to left lower back x unknown, catches on clothing. No pain or bleeding.   C/O spot to scalp x 1 month. No symptoms.     Derm Hx:  Denies Phx of NMSC  Denies Fhx of MM    Current Outpatient Medications:   ·  ascorbic acid, vitamin C, (VITAMIN C) 1000 MG tablet, Take 1,000 mg by mouth once daily., Disp: , Rfl:   ·  calcium carbonate/vitamin D3 (VITAMIN D-3 ORAL), Take 10,000 Units by mouth once daily., Disp: , Rfl:   ·  fish oil-omega-3 fatty acids 300-1,000 mg capsule, Take 1,000 mg by mouth once daily., Disp: , Rfl:   ·  lysine 500 mg Tab, Take 500 mg by mouth once daily., Disp: , Rfl:   ·  magnesium gluconate (MAGONATE) 27.5 mg (500 mg) tablet, Take 500 mg by mouth once daily., Disp: , Rfl:   ·  prasterone, dhea, (DHEA) 25 mg Tab, Take 1 tablet by mouth once daily., Disp: , Rfl:   ·  aspirin (ECOTRIN) 81 MG EC tablet, Take 81 mg by mouth once daily. (Patient not taking: Reported on 8/27/2024), Disp: , Rfl:   ·  ondansetron (ZOFRAN-ODT) 4 MG TbDL, Take 1 tablet (4 mg total) by mouth every 6 (six) hours as needed (nv). (Patient not taking: Reported on 8/27/2024), Disp: 30 tablet, Rfl: 0  ·  rosuvastatin (CRESTOR) 10 MG tablet, Take 1 tablet (10 mg total) by mouth once daily. (Patient not taking: Reported on 8/27/2024), Disp: 90 tablet, Rfl: 1  ·  thyroid, pork, (ARMOUR THYROID) 60 mg Tab, Take 1 tablet (60 mg total) by mouth before breakfast. (Patient not taking: Reported on 8/27/2024), Disp: 90 tablet, Rfl: 3                    Review of Systems   Constitutional:  Negative for fever, chills and fatigue.   Skin:   Positive for wears hat. Negative for daily sunscreen use and activity-related sunscreen use.   Hematologic/Lymphatic: Does not bruise/bleed easily.       Objective:   Physical Exam   Constitutional: He appears well-developed and well-nourished. No distress.   Neurological: He is alert and oriented to person, place, and time. He is not disoriented.   Psychiatric: He has a normal mood and affect.   Skin:   Areas Examined (abnormalities noted in diagram):   Scalp / Hair Palpated and Inspected  Head / Face Inspection Performed  Neck Inspection Performed  Chest / Axilla Inspection Performed  Abdomen Inspection Performed  Back Inspection Performed  RUE Inspected  LUE Inspection Performed  Nails and Digits Inspection Performed                     Diagram Legend     Erythematous scaling macule/papule c/w actinic keratosis       Vascular papule c/w angioma      Pigmented verrucoid papule/plaque c/w seborrheic keratosis      Yellow umbilicated papule c/w sebaceous hyperplasia      Irregularly shaped tan macule c/w lentigo     1-2 mm smooth white papules consistent with Milia      Movable subcutaneous cyst with punctum c/w epidermal inclusion cyst      Subcutaneous movable cyst c/w pilar cyst      Firm pink to brown papule c/w dermatofibroma      Pedunculated fleshy papule(s) c/w skin tag(s)      Evenly pigmented macule c/w junctional nevus     Mildly variegated pigmented, slightly irregular-bordered macule c/w mildly atypical nevus      Flesh colored to evenly pigmented papule c/w intradermal nevus       Pink pearly papule/plaque c/w basal cell carcinoma      Erythematous hyperkeratotic cursted plaque c/w SCC      Surgical scar with no sign of skin cancer recurrence      Open and closed comedones      Inflammatory papules and pustules      Verrucoid papule consistent consistent with wart     Erythematous eczematous patches and plaques     Dystrophic onycholytic nail with subungual debris c/w onychomycosis     Umbilicated  papule    Erythematous-base heme-crusted tan verrucoid plaque consistent with inflamed seborrheic keratosis     Erythematous Silvery Scaling Plaque c/w Psoriasis     See annotation      Assessment / Plan:        Common wart  Cryosurgery procedure note:    Verbal consent from the patient is obtained. Liquid nitrogen cryosurgery is applied to 1 lesions to produce a freeze injury. The patient is aware that blisters may form and is instructed on wound care with gentle cleansing and use of vaseline ointment to keep moist until healed. The patient is supplied a handout on cryosurgery and is instructed to call if lesions do not completely resolve.    Neurofibroma of back  Bothersome to patient, requests removal  Verbal consent obtained. 2 lesions removed with shallow shave removal after anesthesia with 1% lidocaine with epinephrine. Hemostasis achieved with aluminum chloride and hyfrecation. No complications.    Solar lentigo  This is a benign hyperpigmented sun induced lesion. Daily sun protection will reduce the number of new lesions. Treatment of these benign lesions are considered cosmetic.    Seborrheic keratoses  These are benign inherited growths without a malignant potential. Reassurance given to patient. No treatment is necessary.     Skin cancer screening  Upper body skin examination performed today including at least 9 points as noted in physical examination. No lesions suspicious for malignancy noted.    Patient instructed in importance in daily broad spectrum sun protection of at least spf 30. Mineral sunscreen ingredients preferred (Zinc +/- Titanium) and can be found OTC.   Patient encouraged to wear hat for all outdoor exposure.   Also discussed sun avoidance and use of protective clothing.           Follow up if symptoms worsen or fail to improve.

## 2024-10-10 DIAGNOSIS — E78.2 MIXED HYPERLIPIDEMIA: ICD-10-CM

## 2024-10-11 RX ORDER — ROSUVASTATIN CALCIUM 10 MG/1
10 TABLET, COATED ORAL
Qty: 90 TABLET | Refills: 1 | OUTPATIENT
Start: 2024-10-11

## 2024-11-01 ENCOUNTER — OFFICE VISIT (OUTPATIENT)
Dept: FAMILY MEDICINE | Facility: CLINIC | Age: 79
End: 2024-11-01
Payer: MEDICARE

## 2024-11-01 VITALS
HEIGHT: 70 IN | OXYGEN SATURATION: 96 % | DIASTOLIC BLOOD PRESSURE: 72 MMHG | RESPIRATION RATE: 18 BRPM | TEMPERATURE: 98 F | SYSTOLIC BLOOD PRESSURE: 110 MMHG | BODY MASS INDEX: 32.21 KG/M2 | HEART RATE: 74 BPM | WEIGHT: 225 LBS

## 2024-11-01 DIAGNOSIS — D69.6 THROMBOCYTOPENIA, UNSPECIFIED: ICD-10-CM

## 2024-11-01 DIAGNOSIS — E78.5 HYPERLIPIDEMIA, UNSPECIFIED HYPERLIPIDEMIA TYPE: Primary | ICD-10-CM

## 2024-11-01 DIAGNOSIS — E03.9 ACQUIRED HYPOTHYROIDISM: ICD-10-CM

## 2024-11-01 PROCEDURE — 99213 OFFICE O/P EST LOW 20 MIN: CPT | Mod: PBBFAC,PN | Performed by: FAMILY MEDICINE

## 2024-11-01 PROCEDURE — 99999 PR PBB SHADOW E&M-EST. PATIENT-LVL III: CPT | Mod: PBBFAC,,, | Performed by: FAMILY MEDICINE

## 2024-11-01 RX ORDER — LEVOTHYROXINE SODIUM 100 UG/1
100 TABLET ORAL
COMMUNITY
Start: 2024-10-01 | End: 2024-11-01

## 2024-12-30 DIAGNOSIS — E78.2 MIXED HYPERLIPIDEMIA: ICD-10-CM

## 2024-12-30 RX ORDER — ROSUVASTATIN CALCIUM 10 MG/1
10 TABLET, COATED ORAL
Qty: 90 TABLET | Refills: 1 | Status: SHIPPED | OUTPATIENT
Start: 2024-12-30

## 2024-12-30 NOTE — TELEPHONE ENCOUNTER
Patient Seen in: [facility] Emergency Department      History     Chief Complaint   Patient presents with    Abdomen/Flank Pain    Rash     Stated Complaint: Abdominal Pain    Subjective:   HPI          Objective:     Past Medical History:    Chronic obstructive pulmonary disease (HCC)    Generalized anxiety disorder    Pure hypercholesterolemia    Rheumatic fever in pediatric patient              Past Surgical History:   Procedure Laterality Date                      Social History     Socioeconomic History    Marital status:    Tobacco Use    Smoking status: Every Day     Current packs/day: 1.00     Average packs/day: 1 pack/day for 50.8 years (50.8 ttl pk-yrs)     Types: Cigarettes     Start date:     Smokeless tobacco: Never   Vaping Use    Vaping status: Former    Substances: THC, CBD   Substance and Sexual Activity    Alcohol use: Not Currently    Drug use: Yes     Types: Cannabis    Sexual activity: Not Currently     Partners: Male   Social History Narrative    Relationships:  - Jean Paul    Children: Son - Efe (adult)    Pets: Dog - passed away. Got another dog.     School: N/A    Work: Retired - previously a dental .     Origin: Grew up in the [place] area. Parents from [place]    Interests: Shopping - [employer], [employer]. Reading. Cleaning.     Spiritual: Not Jewish, not spiritual                      Physical Exam     ED Triage Vitals [10/19/24 1159]   /83   Pulse 99   Resp 16   Temp 97.9 °F (36.6 °C)   Temp src Oral   SpO2 96 %   O2 Device None (Room air)       Current Vitals:   Vital Signs  BP: 135/83  Pulse: 99  Resp: 16  Temp: 97.9 °F (36.6 °C)  Temp src: Oral    Oxygen Therapy  SpO2: 96 %  O2 Device: None (Room air)        Physical Exam        ED Course     Labs Reviewed   BASIC METABOLIC PANEL (8) - Abnormal; Notable for the following components:       Result Value    Glucose 113 (*)     All other components within normal limits   HEPATIC FUNCTION  LV 11/01/2024  NV 05/02/2025  LF 07/10/2024 180 DAYS    PANEL (7) - Abnormal; Notable for the following components:    Bilirubin, Total 1.2 (*)     Bilirubin, Direct 0.4 (*)     Albumin 5.1 (*)     All other components within normal limits   CBC WITH DIFFERENTIAL WITH PLATELET - Abnormal; Notable for the following components:    RBC 5.33 (*)     HGB 16.9 (*)     HCT 48.1 (*)     All other components within normal limits   LIPASE - Normal   URINALYSIS WITH CULTURE REFLEX                   MDM      70-year-old female with history of COPD presents today with abdominal pain.  Patient states that for the last 6 days, she has been having significant pain in the right side of her abdomen.  Initially was feeling better after bowel movements.  She describes the bowel movements as \"rocks.\"  She tried taking some Dulcolax which did pass some stool.  She is also been taking a large amount of Advil.  Abdominal pain has been worsening, however.  Secondly, she is reporting 2-3 days of an increasing rash on her right thigh.  She states the rash feels like a mild sunburn without significant itching, burning, or pain.  Denies fevers, dysuria, joint pain, shortness of breath, or any new known exposures.    On exam, vitals normal, well-appearing, some tenderness to palpation in the right abdomen without guarding or rebound or distention.  Right thigh with a maculopapular erythematous rash with an L2-4 dermatome distribution.  No vesicular lesions.  No skin sloughing, bullae, or crepitus.    Differential: Right sided abdominal pain concerning for diverticulitis versus constipation versus other etiology.  Right lower extremity rash concerning for medication side effect versus possible shingles.    Will plan to eval with labs, urinalysis, and CT abdomen pelvis.  Plan to treat symptomatically with analgesia, antihistamine, and valacyclovir.        Medical Decision Making      Disposition and Plan     Clinical Impression:  1. Rash    2. Abdominal pain, right lateral         Disposition:  There  is no disposition on file for this visit.  There is no disposition time on file for this visit.    Follow-up:  No follow-up provider specified.  We recommend that you schedule follow up care with a primary care provider within the next three months to obtain basic health screening including reassessment of your blood pressure.      Medications Prescribed:  Current Discharge Medication List        START taking these medications    Details   loratadine 10 MG Oral Tab Take 1 tablet (10 mg total) by mouth daily.  Qty: 30 tablet, Refills: 0      valACYclovir 1 G Oral Tab Take 1 tablet (1,000 mg total) by mouth 3 (three) times daily for 7 days.  Qty: 21 tablet, Refills: 0                 Supplementary Documentation:

## 2025-02-21 DIAGNOSIS — Z00.00 ENCOUNTER FOR MEDICARE ANNUAL WELLNESS EXAM: ICD-10-CM

## 2025-03-14 ENCOUNTER — TELEPHONE (OUTPATIENT)
Dept: FAMILY MEDICINE | Facility: CLINIC | Age: 80
End: 2025-03-14
Payer: MEDICARE

## 2025-03-14 NOTE — TELEPHONE ENCOUNTER
I called the patient in regards to his message, please advise if this is something you feel the patient would be a candidate for

## 2025-03-14 NOTE — TELEPHONE ENCOUNTER
----- Message from ArlenSheilanemagui sent at 3/14/2025 11:48 AM CDT -----  Type: Patient CallWho Called: Patient Does the patient know what this is regarding? Pt is requesting a call back to discuss Iovera . He stated it is a nerve freezing of the knee. Please advise Does the patient rather a call back or a response via [facility]? callBest Call Back Number: [phone] Additional Information:

## 2025-05-02 ENCOUNTER — OFFICE VISIT (OUTPATIENT)
Dept: FAMILY MEDICINE | Facility: CLINIC | Age: 80
End: 2025-05-02
Payer: MEDICARE

## 2025-05-02 VITALS
SYSTOLIC BLOOD PRESSURE: 96 MMHG | DIASTOLIC BLOOD PRESSURE: 76 MMHG | BODY MASS INDEX: 32.63 KG/M2 | WEIGHT: 227.94 LBS | HEART RATE: 83 BPM | OXYGEN SATURATION: 95 % | RESPIRATION RATE: 17 BRPM | HEIGHT: 70 IN | TEMPERATURE: 98 F

## 2025-05-02 DIAGNOSIS — E78.5 HYPERLIPIDEMIA, UNSPECIFIED HYPERLIPIDEMIA TYPE: ICD-10-CM

## 2025-05-02 DIAGNOSIS — E03.9 ACQUIRED HYPOTHYROIDISM: Primary | ICD-10-CM

## 2025-05-02 DIAGNOSIS — M48.8X6 OTHER SPECIFIED SPONDYLOPATHIES, LUMBAR REGION: ICD-10-CM

## 2025-05-02 DIAGNOSIS — E55.9 VITAMIN D DEFICIENCY: ICD-10-CM

## 2025-05-02 DIAGNOSIS — D69.6 THROMBOCYTOPENIA, UNSPECIFIED: ICD-10-CM

## 2025-05-02 DIAGNOSIS — I70.0 ATHEROSCLEROSIS OF AORTA: ICD-10-CM

## 2025-05-02 PROCEDURE — 99213 OFFICE O/P EST LOW 20 MIN: CPT | Mod: PBBFAC,PN | Performed by: FAMILY MEDICINE

## 2025-05-02 PROCEDURE — 99999 PR PBB SHADOW E&M-EST. PATIENT-LVL III: CPT | Mod: PBBFAC,,, | Performed by: FAMILY MEDICINE

## 2025-05-02 RX ORDER — SULFAMETHOXAZOLE AND TRIMETHOPRIM 800; 160 MG/1; MG/1
60 TABLET ORAL
COMMUNITY
Start: 2025-04-28

## 2025-05-02 NOTE — PROGRESS NOTES
Subjective:       Patient ID: Earl Baldwin is a 80 y.o. male.    Chief Complaint: Follow-up (6 month chol)      History of Present Illness    CHIEF COMPLAINT:  Mr. Baldwin presents for a follow-up visit to discuss chronic health conditions and obtain updated lab work.    HPI:  Mr. Baldwin reports ongoing severe knee pain. He has been evaluated by Dr. Muñoz in [place], with X-rays revealing missing cartilage in the knees. Mr. Baldwin is considering knee surgery but has delayed it due to his wife's recent back surgery approximately 3 months ago. His wife is now mobile, with some nerve pain from the surgery, but it is not severe.    Mr. Baldwin has been having episodes of sweating while his wife feels cold, which has been a concern for her. He mentions having back problems in the past, specifically an unstable L4-L5 disk rupture approximately 25 years ago. It occasionally causes mild discomfort but nothing serious, and he can usually manage it.    Mr. Baldwin has a history of slightly elevated PSA levels, monitored by his urologist, Dr. Maier in [place], whom he sees every 6 months for follow-up. He has also been evaluated by a cardiologist, Dr. Leonard, in [place].    Regarding vitamin D levels, patient used to take supplements regularly but has fallen out of his regimen while caring for his wife. His wellness doctor, Angy Steel in [place], recently informed him that his vitamin D levels were low and need attention.    Mr. Baldwin reports his A1C level have been slightly elevated, around 5.6%, indicating pre-diabetes. He has a history of low platelets, with his last known count being 135,000, which he believes is at the bottom of the acceptable range.    Mr. Baldwin denies any side effects from his statin medication or issues with urination.    MEDICATIONS:  Mr. Baldwin is on a low-dose statin for lowering cholesterol. He is prescribed Vitamin D 10,000 IU to be taken every 3-4 days or weekly, but he reports not taking it  recently. Mr. Baldwin discontinued regular vitamin D supplementation due to being busy caring for his wife after her back surgery.    MEDICAL HISTORY:  Mr. Baldwin has a history of hypercholesterolemia managed with low-dose statin medication. He has elevated PSA monitored by a urologist and reported as stable. He has a history of vitamin D deficiency. Mr. Baldwin has pre-diabetes with A1C reported around 5.6%. He has a history of low platelets with the last count at 135,000. Mr. Baldwin has knee issues with cartilage loss noted on X-rays. He is obese with a BMI of 32.7. Mr. Baldwin has back problems, specifically an L4, L5 disk rupture that occurred approximately 25 years ago.    TEST RESULTS:  Mr. Baldwin's cholesterol was last checked in April of last year. His PSA was 1.0 in January 2010. Recent tests show low Vitamin D levels. The last recorded A1C was 5.4%, with recent levels reported around 5.6%. His platelet count is 135,000. Mr. Baldwin has undergone a stress test in the past.    IMAGING:  Knee X-rays were performed by Dr. Muñoz, revealing missing cartilage.    SOCIAL HISTORY:  Marital status:       ROS:  Cardiovascular: +lower extremity edema  Genitourinary: -difficulty urinating  Musculoskeletal: +joint pain, +limb pain, +pain with movement  Endocrine: +excessive sweating          Allergies and Medications:   Review of patient's allergies indicates:   Allergen Reactions    Sulfa (sulfonamide antibiotics) Itching and Rash     Current Medications[1]    Family History:   Family History   Problem Relation Name Age of Onset    No Known Problems Mother      Diabetes Father      Emphysema Father      Diabetes Sister         Social History:   Social History[2]        Objective:     Vitals:    05/02/25 1308   BP: 96/76   Pulse: 83   Resp: 17   Temp: 97.8 °F (36.6 °C)        Physical Exam    General: No acute distress. Well-developed. Well-nourished.  Eyes: EOMI. Sclerae anicteric.  HENT: Normocephalic. Atraumatic. Nares  patent. Moist oral mucosa.  Cardiovascular: Regular rate. Regular rhythm. No murmurs. No rubs. No gallops. Normal S1, S2. Normal heart sounds. PMI at left midclavicular line.  Respiratory: Normal respiratory effort. Clear to auscultation bilaterally. No rales. No rhonchi. No wheezing.  Musculoskeletal: No  obvious deformity.  Extremities: Trace peripheral edema bilaterally, worse in left leg.  Neurological: Alert & oriented x3. No slurred speech. Normal gait.  Psychiatric: Normal mood. Normal affect. Good insight. Good judgment.  Skin: Warm. Dry. No rash.            Assessment:       1. Acquired hypothyroidism    2. Hyperlipidemia, unspecified hyperlipidemia type    3. Vitamin D deficiency    4. Atherosclerosis of aorta    5. Other specified spondylopathies, lumbar region    6. Thrombocytopenia, unspecified        Plan:       Assessment & Plan    M48.8X6 Other specified spondylopathies, lumbar region  M17.0 Bilateral primary osteoarthritis of knee  M51.26 Other intervertebral disc displacement, lumbar region  R97.20 Elevated prostate specific antigen [PSA]  E55.9 Vitamin D deficiency, unspecified  R73.03 Prediabetes  D69.6 Thrombocytopenia, unspecified  E78.5 Hyperlipidemia, unspecified  E66.9 Obesity, unspecified  R60.9 Edema, unspecified    IMPRESSION:  - Cholesterol management: Current use of low-dose statin without side effects. Explained that diet plays a role in lowering cholesterol, but medication (statin) is the primary factor.  - Prostate health: Followed by urologist Dr. Maier for slightly elevated but stable PSA.  - Vitamin D status: History of supplementation and recent low levels reported by wellness doctor.  - A1C levels: Last recorded at 5.4%, indicating pre-diabetic range.  - History of low platelets (135,000), not clinically significant at this time. Discussed significance of low platelet count, explaining it is not concerning unless below 60,000.  - Cardiovascular health: Followed by cardiologist  Dr. Leonard.  - Joint health: Considered surgical options for knee issues with Dr. Muñoz.  - BMI 32.7, placing patient in obese category.    OTHER SPECIFIED SPONDYLOPATHIES, LUMBAR REGION:  - Mr. Balwdin reports an unstable L4, L5 disk rupture that occasionally causes trouble but is not serious with current regimen and exercise.  - Physical exam shows clear chest, lungs clear on auscultation, regular heart rate without murmur, gallop, or rub, and good PMI left midclavicular line.  - Mr. Baldwin is considering knee surgery and getting multiple opinions, but is waiting for his wife's recovery from her back surgery 3 months ago before considering his own surgery.  - Ordered CBC and thyroid check to monitor chronic conditions.    OSTEOARTHRITIS OF KNEE:  - Examined patient's knees and found evidence of degenerative changes with missing cartilage, consistent with reported pain and stiffness.  - Discussed surgical options for the knee issues.    ELEVATED PSA:  - PSA levels have been stable at 1.0 since January 2010 and are being monitored by urologist.  - Advised patient to continue follow-up with urologist for ongoing PSA monitoring.    VITAMIN D DEFICIENCY:  - Mr. Baldwin's vitamin D is slightly decreased as reported by wellness physician.  - Advised to resume supplementation regimen of 10,000 IU every 3-4 days, which was temporarily discontinued due to spouse's surgery.  - Ordered Vitamin D level as part of labs.    PREDIABETES:  - Mr. Baldwin's most recent A1C was 5.4%, improved from typical measurements around 5.6% which indicate prediabetes.  - Ordered A1C as part of labs.  - Advised patient to monitor A1C level and obtain copies of recent lab results.    THROMBOCYTOPENIA:  - Mr. Baldwin had decreased platelets in the past, with a count of 135,000, which is at the lower limit of the reference range.  - Ordered CBC to monitor platelet levels.    HYPERLIPIDEMIA:  - Mr. Baldwin is maintained on low dose statin for hyperlipidemia  "management with no reported adverse effects.  - Last assessment was performed in April of previous year.  - Ordered lipid panel to evaluate current cholesterol levels.    OBESITY:  - Mr. Baldwin's current measurements: weight 227 lbs, height 5'10", BMI 32.7, placing him in the obese category.  - Educated patient on BMI classification and advised portion control for deidre  ght management.    EDEMA:  - Mr. Baldwin has trace peripheral edema bilaterally, more pronounced in the left lower extremity.    FOLLOW-UP AND ADDITIONAL TESTS:  - Ordered additional labs including thyroid function tests, chemistry panel, and microalbumin.  - Mr. Baldwin to obtain copies of recent lab work from wellness doctor (Angy Steel) and forward to office.  - Follow up in 6 months for Health Risk Assessment (HRA) with NP.        Earl was seen today for follow-up.    Diagnoses and all orders for this visit:    Acquired hypothyroidism  -     TSH; Future  -     T4; Future    Hyperlipidemia, unspecified hyperlipidemia type  -     Lipid Panel; Future  -     Comprehensive Metabolic Panel; Future    Vitamin D deficiency  -     Vitamin D; Future    Atherosclerosis of aorta    Other specified spondylopathies, lumbar region    Thrombocytopenia, unspecified         Follow up in about 6 months (around 11/2/2025) for HRA with Guillaume Helms or [facility].  This note was generated with the assistance of ambient listening technology. Verbal consent was obtained by the patient and accompanying visitor(s) for the recording of patient appointment to facilitate this note. I attest to having reviewed and edited the generated note for accuracy, though some syntax or spelling errors may persist. Please contact the author of this note for any clarification.   Subjective:       Patient ID: Earl Baldwin is a 80 y.o. male.    Chief Complaint: Follow-up (6 month chol)    Lab Results   Component Value Date    WBC 5.23 11/27/2023    HGB 14.4 11/27/2023    HCT 41.7 11/27/2023    "  (L) 11/27/2023    CHOL 180 04/29/2024    CHOL 180 04/29/2024    TRIG 76 04/29/2024    TRIG 76 04/29/2024    HDL 59 04/29/2024    HDL 59 04/29/2024    ALT 13 04/29/2024    AST 18 04/29/2024     04/29/2024    K 3.9 04/29/2024     04/29/2024    CREATININE 1.3 04/29/2024    BUN 17 04/29/2024    CO2 27 04/29/2024    TSH 0.711 11/27/2023    PSA 1.0 01/14/2010    HGBA1C 5.4 05/14/2019     Wt Readings from Last 4 Encounters:   05/02/25 103.4 kg (227 lb 15.3 oz)   11/01/24 102.1 kg (225 lb)   08/27/24 99.8 kg (220 lb)   04/26/24 101.2 kg (223 lb)         History of Present Illness    CHIEF COMPLAINT:  Mr. Baldwin presents for a follow-up visit to discuss chronic health conditions and obtain updated lab work.    HPI:  Mr. Baldwin reports ongoing severe knee pain. He has been evaluated by Dr. Muñoz in [place], with X-rays revealing missing cartilage in the knees. Mr. Baldwin is considering knee surgery but has delayed it due to his wife's recent back surgery approximately 3 months ago. His wife is now mobile, with some nerve pain from the surgery, but it is not severe.    Mr. Baldwin has been having episodes of sweating while his wife feels cold, which has been a concern for her. He mentions having back problems in the past, specifically an unstable L4-L5 disk rupture approximately 25 years ago. It occasionally causes mild discomfort but nothing serious, and he can usually manage it.    Mr. Baldwin has a history of slightly elevated PSA levels, monitored by his urologist, Dr. Maier in [place], whom he sees every 6 months for follow-up. He has also been evaluated by a cardiologist, Dr. Leonard, in [place].    Regarding vitamin D levels, patient used to take supplements regularly but has fallen out of his regimen while caring for his wife. His wellness doctor, Angy Steel in [place], recently informed him that his vitamin D levels were low and need attention.    Mr. Baldwin reports his A1C level have been slightly  elevated, around 5.6%, indicating pre-diabetes. He has a history of low platelets, with his last known count being 135,000, which he believes is at the bottom of the acceptable range.    Mr. Baldwin denies any side effects from his statin medication or issues with urination.    MEDICATIONS:  Mr. Baldwin is on a low-dose statin for lowering cholesterol. He is prescribed Vitamin D 10,000 IU to be taken every 3-4 days or weekly, but he reports not taking it recently. Mr. Baldwin discontinued regular vitamin D supplementation due to being busy caring for his wife after her back surgery.    MEDICAL HISTORY:  Mr. Baldwin has a history of hypercholesterolemia managed with low-dose statin medication. He has elevated PSA monitored by a urologist and reported as stable. He has a history of vitamin D deficiency. Mr. Baldwin has pre-diabetes with A1C reported around 5.6%. He has a history of low platelets with the last count at 135,000. Mr. Baldwin has knee issues with cartilage loss noted on X-rays. He is obese with a BMI of 32.7. Mr. Baldwin has back problems, specifically an L4, L5 disk rupture that occurred approximately 25 years ago.    TEST RESULTS:  Mr. Baldwin's cholesterol was last checked in April of last year. His PSA was 1.0 in January 2010. Recent tests show low Vitamin D levels. The last recorded A1C was 5.4%, with recent levels reported around 5.6%. His platelet count is 135,000. Mr. Baldwin has undergone a stress test in the past.    IMAGING:  Knee X-rays were performed by Dr. Muñoz, revealing missing cartilage.    SOCIAL HISTORY:  Marital status:       ROS:  Cardiovascular: +lower extremity edema  Genitourinary: -difficulty urinating  Musculoskeletal: +joint pain, +limb pain, +pain with movement  Endocrine: +excessive sweating          Allergies and Medications:   Review of patient's allergies indicates:   Allergen Reactions    Sulfa (sulfonamide antibiotics) Itching and Rash     Current Medications[3]    Family History:    Family History   Problem Relation Name Age of Onset    No Known Problems Mother      Diabetes Father      Emphysema Father      Diabetes Sister         Social History:   Social History[4]        Objective:     Vitals:    05/02/25 1308   BP: 96/76   Pulse: 83   Resp: 17   Temp: 97.8 °F (36.6 °C)        Physical Exam    General: No acute distress. Well-developed. Well-nourished.  Eyes: EOMI. Sclerae anicteric.  HENT: Normocephalic. Atraumatic. Nares patent. Moist oral mucosa.  Cardiovascular: Regular rate. Regular rhythm. No murmurs. No rubs. No gallops. Normal S1, S2. Normal heart sounds. PMI at left midclavicular line.  Respiratory: Normal respiratory effort. Clear to auscultation bilaterally. No rales. No rhonchi. No wheezing.  Musculoskeletal: No  obvious deformity.  Extremities: Trace peripheral edema bilaterally, worse in left leg.  Neurological: Alert & oriented x3. No slurred speech. Normal gait.  Psychiatric: Normal mood. Normal affect. Good insight. Good judgment.  Skin: Warm. Dry. No rash.            Assessment:       1. Acquired hypothyroidism    2. Hyperlipidemia, unspecified hyperlipidemia type    3. Vitamin D deficiency    4. Atherosclerosis of aorta    5. Other specified spondylopathies, lumbar region    6. Thrombocytopenia, unspecified        Plan:       Assessment & Plan    M48.8X6 Other specified spondylopathies, lumbar region  M17.0 Bilateral primary osteoarthritis of knee  M51.26 Other intervertebral disc displacement, lumbar region  R97.20 Elevated prostate specific antigen [PSA]  E55.9 Vitamin D deficiency, unspecified  R73.03 Prediabetes  D69.6 Thrombocytopenia, unspecified  E78.5 Hyperlipidemia, unspecified  E66.9 Obesity, unspecified  R60.9 Edema, unspecified    IMPRESSION:  - Cholesterol management: Current use of low-dose statin without side effects. Explained that diet plays a role in lowering cholesterol, but medication (statin) is the primary factor.  - Prostate health: Followed by  urologist Dr. Maier for slightly elevated but stable PSA.  - Vitamin D status: History of supplementation and recent low levels reported by wellness doctor.  - A1C levels: Last recorded at 5.4%, indicating pre-diabetic range.  - History of low platelets (135,000), not clinically significant at this time. Discussed significance of low platelet count, explaining it is not concerning unless below 60,000.  - Cardiovascular health: Followed by cardiologist Dr. Leonard.  - Joint health: Considered surgical options for knee issues with Dr. Muñoz.  - BMI 32.7, placing patient in obese category.    OTHER SPECIFIED SPONDYLOPATHIES, LUMBAR REGION:  - Mr. Baldwin reports an unstable L4, L5 disk rupture that occasionally causes trouble but is not serious with current regimen and exercise.  - Physical exam shows clear chest, lungs clear on auscultation, regular heart rate without murmur, gallop, or rub, and good PMI left midclavicular line.  - Mr. Baldwin is considering knee surgery and getting multiple opinions, but is waiting for his wife's recovery from her back surgery 3 months ago before considering his own surgery.  - Ordered CBC and thyroid check to monitor chronic conditions.    OSTEOARTHRITIS OF KNEE:  - Examined patient's knees and found evidence of degenerative changes with missing cartilage, consistent with reported pain and stiffness.  - Discussed surgical options for the knee issues.    ELEVATED PSA:  - PSA levels have been stable at 1.0 since January 2010 and are being monitored by urologist.  - Advised patient to continue follow-up with urologist for ongoing PSA monitoring.    VITAMIN D DEFICIENCY:  - Mr. Baldwin's vitamin D is slightly decreased as reported by wellness physician.  - Advised to resume supplementation regimen of 10,000 IU every 3-4 days, which was temporarily discontinued due to spouse's surgery.  - Ordered Vitamin D level as part of labs.    PREDIABETES:  - Mr. Baldwin's most recent A1C was 5.4%, improved  "from typical measurements around 5.6% which indicate prediabetes.  - Ordered A1C as part of labs.  - Advised patient to monitor A1C level and obtain copies of recent lab results.    THROMBOCYTOPENIA:  - Mr. Baldwin had decreased platelets in the past, with a count of 135,000, which is at the lower limit of the reference range.  - Ordered CBC to monitor platelet levels.    HYPERLIPIDEMIA:  - Mr. Baldwin is maintained on low dose statin for hyperlipidemia management with no reported adverse effects.  - Last assessment was performed in April of previous year.  - Ordered lipid panel to evaluate current cholesterol levels.    OBESITY:  - Mr. Baldwin's current measurements: weight 227 lbs, height 5'10", BMI 32.7, placing him in the obese category.  - Educated patient on BMI classification and advised portion control for deidre  ght management.    EDEMA:  - Mr. Baldwin has trace peripheral edema bilaterally, more pronounced in the left lower extremity.    FOLLOW-UP AND ADDITIONAL TESTS:  - Ordered additional labs including thyroid function tests, chemistry panel, and microalbumin.  - Mr. Baldwin to obtain copies of recent lab work from wellness doctor (Angy Steel) and forward to office.  - Follow up in 6 months for Health Risk Assessment (HRA) with NP.        Earl was seen today for follow-up.    Diagnoses and all orders for this visit:    Acquired hypothyroidism  -     TSH; Future  -     T4; Future    Hyperlipidemia, unspecified hyperlipidemia type  -     Lipid Panel; Future  -     Comprehensive Metabolic Panel; Future    Vitamin D deficiency  -     Vitamin D; Future    Atherosclerosis of aorta    Other specified spondylopathies, lumbar region    Thrombocytopenia, unspecified         Follow up in about 6 months (around 11/2/2025) for HRA with Guillaume Helms or [facility].  This note was generated with the assistance of ambient listening technology. Verbal consent was obtained by the patient and accompanying visitor(s) for the recording " of patient appointment to facilitate this note. I attest to having reviewed and edited the generated note for accuracy, though some syntax or spelling errors may persist. Please contact the author of this note for any clarification.            [1]   Current Outpatient Medications   Medication Sig Dispense Refill    ARMOUR THYROID 60 mg Tab Take 60 mg by mouth.      ascorbic acid, vitamin C, (VITAMIN C) 1000 MG tablet Take 1,000 mg by mouth once daily.      calcium carbonate/vitamin D3 (VITAMIN D-3 ORAL) Take 10,000 Units by mouth once daily.      fish oil-omega-3 fatty acids 300-1,000 mg capsule Take 1,000 mg by mouth once daily.      lysine 500 mg Tab Take 500 mg by mouth once daily.      prasterone, dhea, (DHEA) 25 mg Tab Take 1 tablet by mouth once daily.      rosuvastatin (CRESTOR) 10 MG tablet TAKE 1 TABLET BY MOUTH EVERY DAY 90 tablet 1    magnesium gluconate (MAGONATE) 27.5 mg (500 mg) tablet Take 500 mg by mouth once daily. (Patient not taking: Reported on 2024)      ondansetron (ZOFRAN-ODT) 4 MG TbDL Take 1 tablet (4 mg total) by mouth every 6 (six) hours as needed (nv). (Patient not taking: Reported on 2024) 30 tablet 0     No current facility-administered medications for this visit.   [2]   Social History  Socioeconomic History    Marital status:    Tobacco Use    Smoking status: Former     Current packs/day: 0.00     Types: Cigarettes     Quit date: 1979     Years since quittin.3    Smokeless tobacco: Former   Substance and Sexual Activity    Alcohol use: Yes     Alcohol/week: 7.0 standard drinks of alcohol     Types: 7 Glasses of wine per week     Comment: one glass of wine daily    Drug use: No    Sexual activity: Yes     Partners: Female   Social History Narrative    ** Merged History Encounter **          Social Drivers of Health     Financial Resource Strain: Low Risk  (3/6/2023)    Overall Financial Resource Strain (CARDIA)     Difficulty of Paying Living Expenses: Not  hard at all   Food Insecurity: No Food Insecurity (3/6/2023)    Hunger Vital Sign     Worried About Running Out of Food in the Last Year: Never true     Ran Out of Food in the Last Year: Never true   Transportation Needs: No Transportation Needs (3/6/2023)    PRAPARE - Transportation     Lack of Transportation (Medical): No     Lack of Transportation (Non-Medical): No   Physical Activity: Insufficiently Active (3/6/2023)    Exercise Vital Sign     Days of Exercise per Week: 3 days     Minutes of Exercise per Session: 20 min   Stress: No Stress Concern Present (3/6/2023)    [employer] - Occupational Stress Questionnaire     Feeling of Stress : Only a little   Housing Stability: Low Risk  (3/6/2023)    Housing Stability Vital Sign     Unable to Pay for Housing in the Last Year: No     Number of Places Lived in the Last Year: 1     Unstable Housing in the Last Year: No   [3]   Current Outpatient Medications   Medication Sig Dispense Refill    ARMOUR THYROID 60 mg Tab Take 60 mg by mouth.      ascorbic acid, vitamin C, (VITAMIN C) 1000 MG tablet Take 1,000 mg by mouth once daily.      calcium carbonate/vitamin D3 (VITAMIN D-3 ORAL) Take 10,000 Units by mouth once daily.      fish oil-omega-3 fatty acids 300-1,000 mg capsule Take 1,000 mg by mouth once daily.      lysine 500 mg Tab Take 500 mg by mouth once daily.      prasterone, dhea, (DHEA) 25 mg Tab Take 1 tablet by mouth once daily.      rosuvastatin (CRESTOR) 10 MG tablet TAKE 1 TABLET BY MOUTH EVERY DAY 90 tablet 1    magnesium gluconate (MAGONATE) 27.5 mg (500 mg) tablet Take 500 mg by mouth once daily. (Patient not taking: Reported on 11/1/2024)      ondansetron (ZOFRAN-ODT) 4 MG TbDL Take 1 tablet (4 mg total) by mouth every 6 (six) hours as needed (nv). (Patient not taking: Reported on 8/27/2024) 30 tablet 0     No current facility-administered medications for this visit.   [4]   Social History  Socioeconomic History    Marital  status:    Tobacco Use    Smoking status: Former     Current packs/day: 0.00     Types: Cigarettes     Quit date: 1979     Years since quittin.3    Smokeless tobacco: Former   Substance and Sexual Activity    Alcohol use: Yes     Alcohol/week: 7.0 standard drinks of alcohol     Types: 7 Glasses of wine per week     Comment: one glass of wine daily    Drug use: No    Sexual activity: Yes     Partners: Female   Social History Narrative    ** Merged History Encounter **          Social Drivers of Health     Financial Resource Strain: Low Risk  (3/6/2023)    Overall Financial Resource Strain (CARDIA)     Difficulty of Paying Living Expenses: Not hard at all   Food Insecurity: No Food Insecurity (3/6/2023)    Hunger Vital Sign     Worried About Running Out of Food in the Last Year: Never true     Ran Out of Food in the Last Year: Never true   Transportation Needs: No Transportation Needs (3/6/2023)    PRAPARE - Transportation     Lack of Transportation (Medical): No     Lack of Transportation (Non-Medical): No   Physical Activity: Insufficiently Active (3/6/2023)    Exercise Vital Sign     Days of Exercise per Week: 3 days     Minutes of Exercise per Session: 20 min   Stress: No Stress Concern Present (3/6/2023)    [employer] [place] [employer] - Occupational Stress Questionnaire     Feeling of Stress : Only a little   Housing Stability: Low Risk  (3/6/2023)    Housing Stability Vital Sign     Unable to Pay for Housing in the Last Year: No     Number of Places Lived in the Last Year: 1     Unstable Housing in the Last Year: No

## 2025-05-29 ENCOUNTER — PATIENT MESSAGE (OUTPATIENT)
Dept: FAMILY MEDICINE | Facility: CLINIC | Age: 80
End: 2025-05-29
Payer: MEDICARE

## 2025-06-04 ENCOUNTER — TELEPHONE (OUTPATIENT)
Dept: FAMILY MEDICINE | Facility: CLINIC | Age: 80
End: 2025-06-04
Payer: MEDICARE

## 2025-06-11 ENCOUNTER — OFFICE VISIT (OUTPATIENT)
Dept: FAMILY MEDICINE | Facility: CLINIC | Age: 80
End: 2025-06-11
Payer: MEDICARE

## 2025-06-11 VITALS
WEIGHT: 229.5 LBS | SYSTOLIC BLOOD PRESSURE: 96 MMHG | HEART RATE: 70 BPM | OXYGEN SATURATION: 95 % | TEMPERATURE: 98 F | DIASTOLIC BLOOD PRESSURE: 56 MMHG | BODY MASS INDEX: 32.86 KG/M2 | HEIGHT: 70 IN

## 2025-06-11 DIAGNOSIS — E03.9 ACQUIRED HYPOTHYROIDISM: ICD-10-CM

## 2025-06-11 DIAGNOSIS — K22.70 BARRETT'S ESOPHAGUS WITHOUT DYSPLASIA: ICD-10-CM

## 2025-06-11 DIAGNOSIS — E55.9 VITAMIN D DEFICIENCY: ICD-10-CM

## 2025-06-11 DIAGNOSIS — G47.33 OBSTRUCTIVE SLEEP APNEA: ICD-10-CM

## 2025-06-11 DIAGNOSIS — I35.0 NONRHEUMATIC AORTIC VALVE STENOSIS: ICD-10-CM

## 2025-06-11 DIAGNOSIS — E78.2 MIXED HYPERLIPIDEMIA: Primary | ICD-10-CM

## 2025-06-11 PROCEDURE — 99999 PR PBB SHADOW E&M-EST. PATIENT-LVL III: CPT | Mod: PBBFAC,,, | Performed by: FAMILY MEDICINE

## 2025-06-11 PROCEDURE — 99214 OFFICE O/P EST MOD 30 MIN: CPT | Mod: S$PBB,,, | Performed by: FAMILY MEDICINE

## 2025-06-11 PROCEDURE — 99213 OFFICE O/P EST LOW 20 MIN: CPT | Mod: PBBFAC,PN | Performed by: FAMILY MEDICINE

## 2025-06-11 NOTE — PROGRESS NOTES
Subjective:       Patient ID: Earl Baldwin is a 80 y.o. male.    Chief Complaint: Pre-op Exam (/)      History of Present Illness    CHIEF COMPLAINT:  Mr. Baldwin presents for a pre-operative evaluation and clearance prior to a scheduled left knee replacement surgery.    HPI:  Mr. Baldwin is scheduled for a left knee replacement surgery to be performed by Dr. Ifeanyi Jaime at [facility] in [place]. He has a history of high cholesterol, Lius's esophagus, and non-rheumatic aortic stenosis. Dr. Delgado evaluates him regularly, approximately every 6 months, for monitoring of his aortic stenosis. He denies shortness of breath or dizziness, which would be symptoms of severe aortic stenosis. He has sleep apnea and uses a BiPAP machine, which he will need to bring to the hospital for his stay. He sees a wellness doctor annually who performs comprehensive labs. His most recent labs from March showed normal testosterone levels, elevated homocysteine (a cardiac risk factor), normal T4 and TSH, high T3 (attributed to thyroid supplement), and slightly low platelets at 92, which has been a long-standing finding. His PSA was elevated at 5.8, for which Dr. Darrion Watkins, a urologist, evaluates him every 6 months. His cholesterol was 145 with an LDL of 70, which is considered good. His vitamin D level was 43, which is within normal range. He has no history of adverse reactions to anesthesia. He denies clotting issues.    MEDICATIONS:  Mr. Baldwin is on Naproxen (BiPAP) for sleep apnea and thyroid replacement medication. He is also taking Crestor ([employer] Crestor) for cholesterol management and a vitamin D supplement. Mr. Baldwin reports temporarily discontinuing the vitamin D supplement due to caring for someone who had back surgery and mentions needing to restart it.    MEDICAL HISTORY:  Mr. Badlwin has a history of high cholesterol, Luis's esophagus, non-rheumatic aortic stenosis, sleep apnea, hypothyroidism, low platelet count,  and elevated PSA.    FAMILY HISTORY:  Family history is significant for mother having a knee replacement at age 94. She is currently 98 years old.    SURGICAL HISTORY:  Mr. Baldwin is scheduled for a left knee replacement on July 8th, to be performed by Dr. Ifeanyi Jaime in [place].    TEST RESULTS:  In March, the patient's testosterone level was 697, which is normal. Homocysteine was elevated, noted as a cardiac risk factor. T4 and TSH were normal, while T3 was high due to thyroid supplement. Platelets were 92, lower than normal but not low enough to prevent surgery. PSA was high at 5.8. DHEA was high, and Candida albicans antibody was positive, noted as a normal finding. Vitamin D level was 43, noted as normal. Cholesterol was 145 total with LDL of 70. Mr. Baldwin underwent a sleep study which diagnosed him with sleep apnea. He uses BiPAP for management.    IMAGING:  Mr. Baldwin underwent an angiogram.      ROS:  Constitutional: -dizziness  Respiratory: -shortness of breath, +apnea, +intermittent breathing while sleeping  Musculoskeletal: -joint pain  Allergic: -anaphylactic reactions          Allergies and Medications:   Review of patient's allergies indicates:   Allergen Reactions    Sulfa (sulfonamide antibiotics) Itching and Rash     Current Medications[1]  Lab Results   Component Value Date    WBC 5.23 11/27/2023    HGB 14.4 11/27/2023    HCT 41.7 11/27/2023     (L) 11/27/2023    CHOL 180 04/29/2024    CHOL 180 04/29/2024    TRIG 76 04/29/2024    TRIG 76 04/29/2024    HDL 59 04/29/2024    HDL 59 04/29/2024    ALT 13 04/29/2024    AST 18 04/29/2024     04/29/2024    K 3.9 04/29/2024     04/29/2024    CREATININE 1.3 04/29/2024    BUN 17 04/29/2024    CO2 27 04/29/2024    TSH 0.711 11/27/2023    PSA 1.0 01/14/2010    HGBA1C 5.4 05/14/2019       Family History:   Family History   Problem Relation Name Age of Onset    No Known Problems Mother      Diabetes Father      Emphysema Father      Diabetes  Sister         Social History:   Social History[2]        Objective:     Vitals:    06/11/25 1602   BP: (!) 96/56   Pulse: 70   Temp: 98.2 °F (36.8 °C)        Physical Exam    General: No acute distress. Well-developed. Well-nourished.  Eyes: EOMI. Sclerae anicteric.  HENT: Normocephalic. Atraumatic. Nares patent. Moist oral mucosa.  Cardiovascular: Regular rate. Regular rhythm. No murmurs. No rubs. No gallops. Normal S1, S2.  Respiratory: Normal respiratory effort. Clear to auscultation bilaterally. No rales. No rhonchi. No wheezing.  Musculoskeletal: No  obvious deformity.  Extremities: No lower extremity edema.  Neurological: Alert & oriented x3. No slurred speech. Normal gait.  Psychiatric: Normal mood. Normal affect. Good insight. Good judgment.  Skin: Warm. Dry. No rash.            Assessment:       1. Mixed hyperlipidemia    2. Luis's esophagus without dysplasia    3. Nonrheumatic aortic valve stenosis    4. Obstructive sleep apnea    5. Acquired hypothyroidism    6. Vitamin D deficiency        Plan:       Assessment & Plan    I35.0 Nonrheumatic aortic (valve) stenosis  E78.5 Hyperlipidemia, unspecified  G47.33 Obstructive sleep apnea (adult) (pediatric)  E03.9 Hypothyroidism, unspecified  D69.6 Thrombocytopenia, unspecified  R97.20 Elevated prostate specific antigen [PSA]  R79.89 Other specified abnormal findings of blood chemistry    AORTIC STENOSIS:  - Mr. Baldwin cleared for left knee replacement surgery with general anesthesia despite mild aortic stenosis.  - Mr. Baldwin is asymptomatic with no shortness of breath or dizziness, and the murmur is not significantly loud.  - Mr. Baldwin sees Dr. Delgado regularly every 6 months for monitoring.  - Cardiologist clearance has been confirmed for surgery.    HYPERLIPIDEMIA:  - Cardiovascular risk assessed with optimal cholesterol levels (total 145, LDL 70).  - Mr. Baldwin is well-managed on Crestor.    SLEEP APNEA:  - Mr. Baldwin uses a BiPAP machine for sleep apnea  management.  - Instructed to bring CPAP/BiPAP machine to hospital for use during overnight stay to prevent oxygen desaturation and potential ICU admission.    HYPOTHYROIDISM:  - Thyroid function is normal based on outside lab results from 1.5 years ago, with normal T4 and TSH.  - T3 is elevated due to thyroid supplementation.  - Mr. Baldwin takes thyroid replacement medication.  - Plan to check current thyroid levels to ensure they remain within normal range.    THROMBOCYTOPENIA:  - Platelets slightly low at 92, but not low enough to prevent surgery.  - This may be due to sticky platelets and is not a concern for the procedure.  - Acknowledged surgeon's plan for post-op aspirin regimen for thromboprophylaxis.    ELEVATED PSA:  - PSA level is elevated at 5.8.  - Mr. Baldwin sees urologist Dr. Darrion Watkins every 6 months for monitoring, who is aware and following closely.    ABNORMAL BLOOD CHEMISTRY:  - Homocysteine level is elevated, which is a cardiac risk factor.    SURGICAL PLANNING AND POST-OPERATIVE CARE:  - Discussed differences between spinal and general anesthesia options for knee replacement surgery.  - Emphasized importance of aggressive post-op rehabilitation to aid recovery and prevent blood clots, with recommendation for early mobilization.  - If general anesthesia is used, explained use of incentive spirometer for lung exercises post-op (2 deep breaths every commercial break while watching TV).    FOLLOW-UP:  - Follow up in 3 months after surgery.        Earl was seen today for pre-op exam.    Diagnoses and all orders for this visit:    Mixed hyperlipidemia    Luis's esophagus without dysplasia    Nonrheumatic aortic valve stenosis    Obstructive sleep apnea    Acquired hypothyroidism    Vitamin D deficiency         Follow up in about 4 months (around 10/11/2025).  This note was generated with the assistance of ambient listening technology. Verbal consent was obtained by the patient and accompanying  visitor(s) for the recording of patient appointment to facilitate this note. I attest to having reviewed and edited the generated note for accuracy, though some syntax or spelling errors may persist. Please contact the author of this note for any clarification.            [1]   Current Outpatient Medications   Medication Sig Dispense Refill    ARMOUR THYROID 60 mg Tab Take 60 mg by mouth.      ascorbic acid, vitamin C, (VITAMIN C) 1000 MG tablet Take 1,000 mg by mouth once daily.      calcium carbonate/vitamin D3 (VITAMIN D-3 ORAL) Take 10,000 Units by mouth once daily.      fish oil-omega-3 fatty acids 300-1,000 mg capsule Take 1,000 mg by mouth once daily. (Patient taking differently: Take 1,000 mg by mouth once daily.  On hold for surgery)      lysine 500 mg Tab Take 500 mg by mouth once daily.      magnesium gluconate (MAGONATE) 27.5 mg (500 mg) tablet Take 500 mg by mouth once daily.      prasterone, dhea, (DHEA) 25 mg Tab Take 1 tablet by mouth once daily.      rosuvastatin (CRESTOR) 10 MG tablet TAKE 1 TABLET BY MOUTH EVERY DAY (Patient not taking: Reported on 2025) 90 tablet 1     No current facility-administered medications for this visit.   [2]   Social History  Socioeconomic History    Marital status:    Tobacco Use    Smoking status: Former     Current packs/day: 0.00     Types: Cigarettes     Quit date: 1979     Years since quittin.4    Smokeless tobacco: Former   Substance and Sexual Activity    Alcohol use: Yes     Alcohol/week: 7.0 standard drinks of alcohol     Types: 7 Glasses of wine per week     Comment: one glass of wine daily    Drug use: No    Sexual activity: Yes     Partners: Female   Social History Narrative    ** Merged History Encounter **          Social Drivers of Health     Financial Resource Strain: Low Risk  (3/6/2023)    Overall Financial Resource Strain (CARDIA)     Difficulty of Paying Living Expenses: Not hard at all   Food Insecurity: No Food Insecurity  (3/6/2023)    Hunger Vital Sign     Worried About Running Out of Food in the Last Year: Never true     Ran Out of Food in the Last Year: Never true   Transportation Needs: No Transportation Needs (3/6/2023)    PRAPARE - Transportation     Lack of Transportation (Medical): No     Lack of Transportation (Non-Medical): No   Physical Activity: Insufficiently Active (3/6/2023)    Exercise Vital Sign     Days of Exercise per Week: 3 days     Minutes of Exercise per Session: 20 min   Stress: No Stress Concern Present (3/6/2023)    [employer] [place] [employer] - Occupational Stress Questionnaire     Feeling of Stress : Only a little   Housing Stability: Low Risk  (3/6/2023)    Housing Stability Vital Sign     Unable to Pay for Housing in the Last Year: No     Number of Places Lived in the Last Year: 1     Unstable Housing in the Last Year: No

## 2025-07-02 ENCOUNTER — DOCUMENTATION ONLY (OUTPATIENT)
Dept: FAMILY MEDICINE | Facility: CLINIC | Age: 80
End: 2025-07-02
Payer: MEDICARE

## 2025-07-02 ENCOUNTER — TELEPHONE (OUTPATIENT)
Dept: FAMILY MEDICINE | Facility: CLINIC | Age: 80
End: 2025-07-02
Payer: MEDICARE

## 2025-07-02 NOTE — LETTER
[facility]      Payam Noonan MD              [address]  [facility] [address]  Dept: [phone]  Dept Fax: [phone]                                                                                                     Re: Name: Earl Baldwin          : 1945        Earl Baldwin has been examined by me on 2025, and appears to be physically well for       __x_ General Anesthesia and surgery.  His platelets are below 100,000 but not too low to prevent surgical clearance.  We need to recheck his pressure and make sure it is at least 100/60.      ___ Sports Participation     ___ School /      ___ Camp        Sincerely,        Payam Noonan MD

## 2025-07-02 NOTE — PROGRESS NOTES
Surgical clearance on this patient who had a recent CBC showing low platelets at 98,000. His most recent platelet count in our office was 92,000 this is below normal but not too low to prevent surgical clearance he is still cleared for surgery I need to make sure his pressure has gone up from when we checked it in our office weight was 96/56.  Also see the previous note dated 06/11/2025.

## (undated) DEVICE — LINER SUCTION 3000CC

## (undated) DEVICE — SYR 50CC LL

## (undated) DEVICE — SUT 2-0 VICRYL / SH (J417)

## (undated) DEVICE — SEAL UNIVERSAL 5MM-8MM XI

## (undated) DEVICE — PACK SIRUS BASIC V SURG STRL

## (undated) DEVICE — SET TUBE PNEUMOCLEAR SE HI FLO

## (undated) DEVICE — ELECTRODE REM PLYHSV RETURN 9

## (undated) DEVICE — GLOVE SENSICARE PI ALOE 7.5

## (undated) DEVICE — COVER TIP CURVED SCISSORS XI

## (undated) DEVICE — STRAP OR TABLE 5IN X 72IN

## (undated) DEVICE — STRIP MEDI WND CLSR 1/2X4IN

## (undated) DEVICE — TOWEL OR DISP STRL BLUE 4/PK

## (undated) DEVICE — CLIPPER BLADE MOD 4406 (CAREF)

## (undated) DEVICE — NDL SPINAL 18GX3.5 SPINOCAN

## (undated) DEVICE — GOWN POLY REINF BRTH SLV XL

## (undated) DEVICE — SUT V-LOC 180 2-0 GS-22 9IN

## (undated) DEVICE — OBTURATOR BLADELESS 8MM XI CLR

## (undated) DEVICE — DRAPE COLUMN DAVINCI XI

## (undated) DEVICE — SUT MONOCRYL 4-0 SH UND MON

## (undated) DEVICE — SUT 0 VICRYL / UR6 (J603)

## (undated) DEVICE — SOL NACL IRR 1000ML BTL

## (undated) DEVICE — SYR LUER LOCK STERILE 10ML

## (undated) DEVICE — GLOVE SENSICARE PI GRN 7

## (undated) DEVICE — GLOVE SENSICARE PI GRN 8.5

## (undated) DEVICE — NDL SAFETY 21G X 1 1/2 ECLPSE

## (undated) DEVICE — DRAPE ARM DAVINCI XI

## (undated) DEVICE — ADHESIVE DERMABOND ADVANCED

## (undated) DEVICE — SUT 3-0 VICRYL / SH (J416)

## (undated) DEVICE — DRAPE ABDOMINAL TIBURON 14X11

## (undated) DEVICE — APPLICATOR CHLORAPREP ORN 26ML

## (undated) DEVICE — SOL ELECTROLUBE ANTI-STIC

## (undated) DEVICE — NDL PNEUMO INSUFFLATI 120MM

## (undated) DEVICE — GLOVE SENSICARE PI ALOE 7

## (undated) DEVICE — SUT MONOCRYL 4-0 PS-2

## (undated) DEVICE — GOWN POLY REINF BRTH SLV 2XL

## (undated) DEVICE — GLOVE SENSICARE PI ALOE 8.5

## (undated) DEVICE — PACK CUSTOM UNIV BASIN SLI